# Patient Record
Sex: FEMALE | Race: BLACK OR AFRICAN AMERICAN | NOT HISPANIC OR LATINO | URBAN - METROPOLITAN AREA
[De-identification: names, ages, dates, MRNs, and addresses within clinical notes are randomized per-mention and may not be internally consistent; named-entity substitution may affect disease eponyms.]

---

## 2023-04-05 ENCOUNTER — LAB (OUTPATIENT)
Dept: LAB | Facility: LAB | Age: 33
End: 2023-04-05
Payer: COMMERCIAL

## 2023-04-05 ENCOUNTER — OFFICE VISIT (OUTPATIENT)
Dept: PRIMARY CARE | Facility: CLINIC | Age: 33
End: 2023-04-05
Payer: COMMERCIAL

## 2023-04-05 VITALS
OXYGEN SATURATION: 99 % | TEMPERATURE: 98.4 F | DIASTOLIC BLOOD PRESSURE: 81 MMHG | WEIGHT: 138 LBS | HEIGHT: 63 IN | SYSTOLIC BLOOD PRESSURE: 116 MMHG | BODY MASS INDEX: 24.45 KG/M2 | HEART RATE: 99 BPM

## 2023-04-05 DIAGNOSIS — R53.83 FATIGUE, UNSPECIFIED TYPE: ICD-10-CM

## 2023-04-05 DIAGNOSIS — Z30.42 DEPO-PROVERA CONTRACEPTIVE STATUS: Primary | ICD-10-CM

## 2023-04-05 PROBLEM — R87.610 ATYPICAL SQUAMOUS CELLS OF UNDETERMINED SIGNIFICANCE (ASCUS) ON PAPANICOLAOU SMEAR OF CERVIX: Status: ACTIVE | Noted: 2023-04-05

## 2023-04-05 PROBLEM — A56.09 CHLAMYDIA VAGINITIS/CERVICITIS: Status: ACTIVE | Noted: 2023-04-05

## 2023-04-05 PROBLEM — N76.0 BACTERIAL VAGINOSIS: Status: ACTIVE | Noted: 2023-04-05

## 2023-04-05 PROBLEM — A56.02 CHLAMYDIA VAGINITIS/CERVICITIS: Status: ACTIVE | Noted: 2023-04-05

## 2023-04-05 PROBLEM — N64.3 GALACTORRHEA: Status: ACTIVE | Noted: 2023-04-05

## 2023-04-05 PROBLEM — B37.31 CANDIDAL VULVOVAGINITIS: Status: ACTIVE | Noted: 2023-04-05

## 2023-04-05 PROBLEM — K59.00 CONSTIPATION: Status: ACTIVE | Noted: 2023-04-05

## 2023-04-05 PROBLEM — S63.259A FINGER DISLOCATION: Status: ACTIVE | Noted: 2023-04-05

## 2023-04-05 PROBLEM — B96.89 BACTERIAL VAGINOSIS: Status: ACTIVE | Noted: 2023-04-05

## 2023-04-05 LAB
ERYTHROCYTE DISTRIBUTION WIDTH (RATIO) BY AUTOMATED COUNT: 12.3 % (ref 11.5–14.5)
ERYTHROCYTE MEAN CORPUSCULAR HEMOGLOBIN CONCENTRATION (G/DL) BY AUTOMATED: 33 G/DL (ref 32–36)
ERYTHROCYTE MEAN CORPUSCULAR VOLUME (FL) BY AUTOMATED COUNT: 96 FL (ref 80–100)
ERYTHROCYTES (10*6/UL) IN BLOOD BY AUTOMATED COUNT: 4.28 X10E12/L (ref 4–5.2)
HEMATOCRIT (%) IN BLOOD BY AUTOMATED COUNT: 41.2 % (ref 36–46)
HEMOGLOBIN (G/DL) IN BLOOD: 13.6 G/DL (ref 12–16)
LEUKOCYTES (10*3/UL) IN BLOOD BY AUTOMATED COUNT: 6.6 X10E9/L (ref 4.4–11.3)
NRBC (PER 100 WBCS) BY AUTOMATED COUNT: 0 /100 WBC (ref 0–0)
PLATELETS (10*3/UL) IN BLOOD AUTOMATED COUNT: 295 X10E9/L (ref 150–450)
PREGNANCY TEST URINE, POC: NEGATIVE

## 2023-04-05 PROCEDURE — 99213 OFFICE O/P EST LOW 20 MIN: CPT

## 2023-04-05 PROCEDURE — 81025 URINE PREGNANCY TEST: CPT

## 2023-04-05 PROCEDURE — 85027 COMPLETE CBC AUTOMATED: CPT

## 2023-04-05 PROCEDURE — 1036F TOBACCO NON-USER: CPT

## 2023-04-05 PROCEDURE — 36415 COLL VENOUS BLD VENIPUNCTURE: CPT

## 2023-04-05 PROCEDURE — 96372 THER/PROPH/DIAG INJ SC/IM: CPT

## 2023-04-05 RX ORDER — ACETAMINOPHEN 500 MG
5 TABLET ORAL NIGHTLY
COMMUNITY
Start: 2022-05-03

## 2023-04-05 RX ORDER — MEDROXYPROGESTERONE ACETATE 150 MG/ML
150 INJECTION, SUSPENSION INTRAMUSCULAR ONCE
Status: COMPLETED | OUTPATIENT
Start: 2023-04-05 | End: 2023-04-05

## 2023-04-05 RX ADMIN — MEDROXYPROGESTERONE ACETATE 150 MG: 150 INJECTION, SUSPENSION INTRAMUSCULAR at 14:20

## 2023-04-05 ASSESSMENT — PAIN SCALES - GENERAL: PAINLEVEL: 6
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
## 2023-04-05 NOTE — LETTER
April 26, 2023     Patient: Jarrett Manriquez   YOB: 1990   Date of Visit: 4/5/2023       To Whom It May Concern:    It is my medical opinion that Jarrett Manriquez may return to light duty immediately with the following restrictions:      Avoid physically demanding activities that require lifting >20 lbs   Avoid reaching above shoulder level    While Ms. Manriquez has made significant progress in her recovery, she still requires some accommodation at work. Due to her condition, she is unable to perform heavy work that requires significant physical exertion. However, she is capable of performing light work that does not require heavy lifting or strenuous physical activity.    Furthermore, she is able to type and use a computer without any restrictions.     Please let me know if you require any additional information or have any questions. I am confident that Ms. Manriquez will be able to make a positive contribution to your team with these accommodations in place.    Sincerely,    Mickey Lowery MD, MPH                  If you have any questions or concerns, please don't hesitate to call.         Sincerely,        Mickey Lowery MD    CC: No Recipients

## 2023-04-05 NOTE — LETTER
April 7, 2023     Patient: Jarrett Manriquez   YOB: 1990   Date of Visit: 4/5/2023       To Whom It May Concern:    It is my medical opinion that Jarrett Manriquez has the following work restrictions.     Employee's Capabilities  Lifting/Carrying: less than 40 lbs.  Simple grasping: patient with limited ability to grasp with left hand.   Fine manipulation (includes keyboarding): not at all  Reaching above shoulder: patient with limited ability to reach above shoulder     If you have any questions or concerns, please don't hesitate to call.         Sincerely,        Mickey Lowery MD, MPH    CC: No Recipients
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
## 2023-04-10 NOTE — PROGRESS NOTES
I reviewed with the resident the medical history and the resident’s findings on physical examination.  I discussed with the resident the patient’s diagnosis and concur with the treatment plan as documented in the resident note.     David Ramirez MD

## 2023-06-09 ENCOUNTER — LAB (OUTPATIENT)
Dept: LAB | Facility: LAB | Age: 33
End: 2023-06-09
Payer: COMMERCIAL

## 2023-06-09 ENCOUNTER — OFFICE VISIT (OUTPATIENT)
Dept: PRIMARY CARE | Facility: CLINIC | Age: 33
End: 2023-06-09
Payer: COMMERCIAL

## 2023-06-09 ENCOUNTER — APPOINTMENT (OUTPATIENT)
Dept: PRIMARY CARE | Facility: CLINIC | Age: 33
End: 2023-06-09
Payer: COMMERCIAL

## 2023-06-09 VITALS
DIASTOLIC BLOOD PRESSURE: 80 MMHG | SYSTOLIC BLOOD PRESSURE: 120 MMHG | HEART RATE: 69 BPM | OXYGEN SATURATION: 98 % | WEIGHT: 134.2 LBS | TEMPERATURE: 97.7 F | BODY MASS INDEX: 24.15 KG/M2

## 2023-06-09 DIAGNOSIS — R30.0 DYSURIA: ICD-10-CM

## 2023-06-09 DIAGNOSIS — K21.9 GASTROESOPHAGEAL REFLUX DISEASE, UNSPECIFIED WHETHER ESOPHAGITIS PRESENT: ICD-10-CM

## 2023-06-09 DIAGNOSIS — R30.0 DYSURIA: Primary | ICD-10-CM

## 2023-06-09 LAB
HIV 1/ 2 AG/AB SCREEN: NONREACTIVE
SYPHILIS TOTAL AB: NONREACTIVE

## 2023-06-09 PROCEDURE — 36415 COLL VENOUS BLD VENIPUNCTURE: CPT

## 2023-06-09 PROCEDURE — 99213 OFFICE O/P EST LOW 20 MIN: CPT | Performed by: STUDENT IN AN ORGANIZED HEALTH CARE EDUCATION/TRAINING PROGRAM

## 2023-06-09 PROCEDURE — 87389 HIV-1 AG W/HIV-1&-2 AB AG IA: CPT

## 2023-06-09 PROCEDURE — 86780 TREPONEMA PALLIDUM: CPT

## 2023-06-09 PROCEDURE — 1036F TOBACCO NON-USER: CPT | Performed by: STUDENT IN AN ORGANIZED HEALTH CARE EDUCATION/TRAINING PROGRAM

## 2023-06-09 RX ORDER — FAMOTIDINE 10 MG/1
10 TABLET ORAL 2 TIMES DAILY
Qty: 60 TABLET | Refills: 5 | Status: SHIPPED | OUTPATIENT
Start: 2023-06-09 | End: 2023-12-06

## 2023-06-09 ASSESSMENT — PAIN SCALES - GENERAL: PAINLEVEL: 0-NO PAIN

## 2023-06-09 NOTE — PROGRESS NOTES
Subjective   Patient ID: Jarrett Manriquez is a 33 y.o. female who presents for STI Screening.    HPI     #Dysuria  -Recent seen in ED in end of May when symptoms began, wet prep done, was treated with Metronidazole gel, pt completed this course, is still having vaginal discharge that is light brown  -Pt is on Depo, normally does not have periods, last depo in 4/2023  -Denies fevers or chills, has chronic back pain which has not gotten worse  -Denies blisters in genital area     #Abdominal pain  -Feels this mainly in the mornings, gets frequent indigestion, pt does not eat a lot of spicy foods, but does well with tomatoes, eats late in the night   -Pain is in upper quadrants bl, lasts for a few days, goes away on its own, denies any nausea or vomiting, eating well, denies swallowing issues  -Drinks lots of water       Review of Systems    Denies any current fevers, chest pain, SOB, nausea, back pain    Objective   /80 (BP Location: Right arm, Patient Position: Sitting, BP Cuff Size: Adult)   Pulse 69   Temp 36.5 °C (97.7 °F) (Temporal)   Wt 60.9 kg (134 lb 3.2 oz)   SpO2 98%   BMI 24.15 kg/m²     Physical Exam    Constitutional: Well developed, awake, alert, oriented x3  Head and Face: NCAT  Eyes: Normal external exam, clear sclera bl  ENT: Normal external inspection of ears and nose. Oropharynx normal.  Cardiovascular: RRR, S1/S2, no murmurs, rubs, or gallops, radial pulses +2, no edema of extremities  Pulmonary: CTAB, no respiratory distress.  Abdomen: +BS, soft, non-tender, nondistended, no guarding or rebound, no masses noted  Neuro: A&O x3, CN II-XII grossly intact, no focal neuro deficits   MSK: No joint swelling, normal movements of all extremities. Range of motion- normal. CVA tenderness negative bl  Skin- No lesions, contusions, or erythema.  Psychiatric: Judgment intact. Appropriate mood and behavior     Assessment/Plan   Diagnoses and all orders for this visit:  Dysuria  -     C.  Trachomatis / N. Gonorrhoeae, Amplified Detection; Future  -     HIV 1/2 Antigen/Antibody Screen with Reflex to Confirmation; Future  -     Syphilis Screen with Reflex; Future  -     TRICH VAGINALIS, AMPLIFIED; Future  -     Urinalysis with Reflex Microscopic; Future  -     Urine Culture; Future  -No red flag symptoms or findings on exam suspicious of sepsis or pyelo, recently had wet prep and treated for BV, will screen for all STIs and UTIs and treat PRN  Gastroesophageal reflux disease, unspecified whether esophagitis present  -     famotidine (Pepcid) 10 mg tablet; Take 1 tablet (10 mg) by mouth 2 times a day.  -Symptoms appearing as GERD, worse in AM, consulted pt about proper GERD/ food eating etiquette and to avoid eating late in the night, begin pepcid    Discussed with:  Dr. Kapadia  Return in : 1-2 months for FUV    Portions of this note were generated using digital voice recognition software, and may contain grammatical errors       Yomi Jones MD  PGY-3, Family Medicine

## 2023-06-16 NOTE — PROGRESS NOTES
I reviewed with the resident the medical history and the resident’s findings on physical examination.  I discussed with the resident the patient’s diagnosis and concur with the treatment plan as documented in the resident note.     Follow-up in 1 month for GERD, and to discuss test results.    Merle Kapadia MD

## 2023-06-22 ENCOUNTER — CLINICAL SUPPORT (OUTPATIENT)
Dept: PRIMARY CARE | Facility: CLINIC | Age: 33
End: 2023-06-22
Payer: COMMERCIAL

## 2023-06-22 VITALS
HEART RATE: 85 BPM | TEMPERATURE: 97 F | HEIGHT: 63 IN | OXYGEN SATURATION: 99 % | SYSTOLIC BLOOD PRESSURE: 103 MMHG | DIASTOLIC BLOOD PRESSURE: 69 MMHG | BODY MASS INDEX: 24.15 KG/M2

## 2023-06-22 DIAGNOSIS — Z30.42 DEPO-PROVERA CONTRACEPTIVE STATUS: Primary | ICD-10-CM

## 2023-06-22 PROCEDURE — 96372 THER/PROPH/DIAG INJ SC/IM: CPT | Performed by: FAMILY MEDICINE

## 2023-06-22 RX ORDER — MEDROXYPROGESTERONE ACETATE 150 MG/ML
150 INJECTION, SUSPENSION INTRAMUSCULAR ONCE
Status: COMPLETED | OUTPATIENT
Start: 2023-06-22 | End: 2023-06-22

## 2023-06-22 RX ADMIN — MEDROXYPROGESTERONE ACETATE 150 MG: 150 INJECTION, SUSPENSION INTRAMUSCULAR at 13:42

## 2023-06-22 ASSESSMENT — PAIN SCALES - GENERAL: PAINLEVEL: 0-NO PAIN

## 2023-09-14 ENCOUNTER — CLINICAL SUPPORT (OUTPATIENT)
Dept: PRIMARY CARE | Facility: CLINIC | Age: 33
End: 2023-09-14
Payer: COMMERCIAL

## 2023-09-14 VITALS
BODY MASS INDEX: 23.11 KG/M2 | HEART RATE: 79 BPM | SYSTOLIC BLOOD PRESSURE: 117 MMHG | WEIGHT: 128.4 LBS | DIASTOLIC BLOOD PRESSURE: 75 MMHG

## 2023-09-14 DIAGNOSIS — Z30.42 DEPO-PROVERA CONTRACEPTIVE STATUS: Primary | ICD-10-CM

## 2023-09-14 PROCEDURE — 96372 THER/PROPH/DIAG INJ SC/IM: CPT | Performed by: STUDENT IN AN ORGANIZED HEALTH CARE EDUCATION/TRAINING PROGRAM

## 2023-09-14 RX ORDER — MEDROXYPROGESTERONE ACETATE 150 MG/ML
150 INJECTION, SUSPENSION INTRAMUSCULAR ONCE
Status: COMPLETED | OUTPATIENT
Start: 2023-09-14 | End: 2023-09-14

## 2023-09-14 RX ADMIN — MEDROXYPROGESTERONE ACETATE 150 MG: 150 INJECTION, SUSPENSION INTRAMUSCULAR at 14:34

## 2023-09-14 NOTE — PROGRESS NOTES
Pt here to receive 3rd dose depo provera, 2nd NV. B/P WNL. Tolerated without event. Calendar given for window next nurse visit administration.

## 2023-12-04 ENCOUNTER — CLINICAL SUPPORT (OUTPATIENT)
Dept: PRIMARY CARE | Facility: CLINIC | Age: 33
End: 2023-12-04
Payer: COMMERCIAL

## 2023-12-04 VITALS — HEART RATE: 91 BPM | DIASTOLIC BLOOD PRESSURE: 84 MMHG | SYSTOLIC BLOOD PRESSURE: 122 MMHG

## 2023-12-04 DIAGNOSIS — Z30.42 DEPO-PROVERA CONTRACEPTIVE STATUS: Primary | ICD-10-CM

## 2023-12-04 PROCEDURE — 96372 THER/PROPH/DIAG INJ SC/IM: CPT | Performed by: STUDENT IN AN ORGANIZED HEALTH CARE EDUCATION/TRAINING PROGRAM

## 2023-12-04 RX ORDER — MEDROXYPROGESTERONE ACETATE 150 MG/ML
150 INJECTION, SUSPENSION INTRAMUSCULAR ONCE
Status: COMPLETED | OUTPATIENT
Start: 2023-12-04 | End: 2023-12-04

## 2023-12-04 RX ADMIN — MEDROXYPROGESTERONE ACETATE 150 MG: 150 INJECTION, SUSPENSION INTRAMUSCULAR at 11:36

## 2023-12-04 NOTE — PROGRESS NOTES
Pt in clinic today to receive Depo contraception. This is pt 3rd NV. VS WNL. Administered to L arm without event. Calendar given for next administration window. Annual provider visit scheduled via this nurse.

## 2024-02-19 ENCOUNTER — OFFICE VISIT (OUTPATIENT)
Dept: PRIMARY CARE | Facility: CLINIC | Age: 34
End: 2024-02-19
Payer: COMMERCIAL

## 2024-02-19 VITALS
DIASTOLIC BLOOD PRESSURE: 68 MMHG | OXYGEN SATURATION: 100 % | TEMPERATURE: 98 F | HEIGHT: 62 IN | BODY MASS INDEX: 23.74 KG/M2 | SYSTOLIC BLOOD PRESSURE: 104 MMHG | WEIGHT: 129 LBS | HEART RATE: 106 BPM

## 2024-02-19 DIAGNOSIS — Z30.42 DEPO-PROVERA CONTRACEPTIVE STATUS: Primary | ICD-10-CM

## 2024-02-19 DIAGNOSIS — M54.50 LOW BACK PAIN WITHOUT SCIATICA, UNSPECIFIED BACK PAIN LATERALITY, UNSPECIFIED CHRONICITY: ICD-10-CM

## 2024-02-19 PROCEDURE — 96372 THER/PROPH/DIAG INJ SC/IM: CPT

## 2024-02-19 PROCEDURE — 99214 OFFICE O/P EST MOD 30 MIN: CPT

## 2024-02-19 PROCEDURE — 1036F TOBACCO NON-USER: CPT

## 2024-02-19 RX ORDER — MEDROXYPROGESTERONE ACETATE 150 MG/ML
150 INJECTION, SUSPENSION INTRAMUSCULAR ONCE
Status: COMPLETED | OUTPATIENT
Start: 2024-02-19 | End: 2024-02-19

## 2024-02-19 RX ADMIN — MEDROXYPROGESTERONE ACETATE 150 MG: 150 INJECTION, SUSPENSION INTRAMUSCULAR at 14:42

## 2024-02-19 ASSESSMENT — ENCOUNTER SYMPTOMS
NUMBNESS: 1
FACIAL ASYMMETRY: 0
CHILLS: 0
EYE DISCHARGE: 0
SORE THROAT: 0
PALPITATIONS: 0
DIZZINESS: 0
WOUND: 1
LIGHT-HEADEDNESS: 0
BACK PAIN: 1
DYSURIA: 0
NECK STIFFNESS: 0
SHORTNESS OF BREATH: 0
EYE REDNESS: 0
SEIZURES: 0
ABDOMINAL DISTENTION: 0
FATIGUE: 0
COUGH: 0
EYE PAIN: 0
UNEXPECTED WEIGHT CHANGE: 0
ARTHRALGIAS: 0
EYE ITCHING: 0
NAUSEA: 0
CONSTIPATION: 0
WEAKNESS: 1
VOMITING: 0
FEVER: 0
ABDOMINAL PAIN: 0
DIFFICULTY URINATING: 0
DIARRHEA: 0
CHOKING: 0
CHEST TIGHTNESS: 0

## 2024-02-19 ASSESSMENT — PAIN SCALES - GENERAL: PAINLEVEL: 0-NO PAIN

## 2024-02-19 ASSESSMENT — PATIENT HEALTH QUESTIONNAIRE - PHQ9
SUM OF ALL RESPONSES TO PHQ QUESTIONS 1-9: 12
8. MOVING OR SPEAKING SO SLOWLY THAT OTHER PEOPLE COULD HAVE NOTICED. OR THE OPPOSITE, BEING SO FIGETY OR RESTLESS THAT YOU HAVE BEEN MOVING AROUND A LOT MORE THAN USUAL: MORE THAN HALF THE DAYS
10. IF YOU CHECKED OFF ANY PROBLEMS, HOW DIFFICULT HAVE THESE PROBLEMS MADE IT FOR YOU TO DO YOUR WORK, TAKE CARE OF THINGS AT HOME, OR GET ALONG WITH OTHER PEOPLE: NOT DIFFICULT AT ALL
6. FEELING BAD ABOUT YOURSELF - OR THAT YOU ARE A FAILURE OR HAVE LET YOURSELF OR YOUR FAMILY DOWN: NOT AT ALL
9. THOUGHTS THAT YOU WOULD BE BETTER OFF DEAD, OR OF HURTING YOURSELF: NOT AT ALL
7. TROUBLE CONCENTRATING ON THINGS, SUCH AS READING THE NEWSPAPER OR WATCHING TELEVISION: MORE THAN HALF THE DAYS
3. TROUBLE FALLING OR STAYING ASLEEP OR SLEEPING TOO MUCH: MORE THAN HALF THE DAYS
4. FEELING TIRED OR HAVING LITTLE ENERGY: MORE THAN HALF THE DAYS
5. POOR APPETITE OR OVEREATING: NOT AT ALL
2. FEELING DOWN, DEPRESSED OR HOPELESS: MORE THAN HALF THE DAYS
1. LITTLE INTEREST OR PLEASURE IN DOING THINGS: MORE THAN HALF THE DAYS
SUM OF ALL RESPONSES TO PHQ9 QUESTIONS 1 AND 2: 4

## 2024-02-19 ASSESSMENT — ANXIETY QUESTIONNAIRES
1. FEELING NERVOUS, ANXIOUS, OR ON EDGE: NEARLY EVERY DAY
7. FEELING AFRAID AS IF SOMETHING AWFUL MIGHT HAPPEN: NEARLY EVERY DAY
5. BEING SO RESTLESS THAT IT IS HARD TO SIT STILL: NOT AT ALL
GAD7 TOTAL SCORE: 16
4. TROUBLE RELAXING: MORE THAN HALF THE DAYS
6. BECOMING EASILY ANNOYED OR IRRITABLE: MORE THAN HALF THE DAYS
IF YOU CHECKED OFF ANY PROBLEMS ON THIS QUESTIONNAIRE, HOW DIFFICULT HAVE THESE PROBLEMS MADE IT FOR YOU TO DO YOUR WORK, TAKE CARE OF THINGS AT HOME, OR GET ALONG WITH OTHER PEOPLE: SOMEWHAT DIFFICULT
3. WORRYING TOO MUCH ABOUT DIFFERENT THINGS: NEARLY EVERY DAY
2. NOT BEING ABLE TO STOP OR CONTROL WORRYING: NEARLY EVERY DAY

## 2024-02-19 NOTE — PROGRESS NOTES
"Subjective   Jarrett Manriquez is a 33 y.o. female with no significant PMH presenting today for followup.    Depo shot:  She has not had her period for about 14 years with no break through bleeding or known side effects such as mood swings, unwanted weight changes.  She is happy with her current contraceptive regiment.     Low back pain:  Pt endorses low back pain for the past year and has been getting worse. It is in her lower lumbar region, more on the left side. She says it feels like everything is tight and is worse when she stands for prolonged period of time. It does not radiate and there are no motor or sensation loss and no bladder or bowel incontinence.     Bug bite on hand:  Pt was bitten on her L thumb/hand roughly 2-3 weeks ago. It was very itchy at worse, and then she scratched it a lot and it eventually became red, swollen, and pus filled. She went to the ED on 2/1 and they gave her cephalexin. She did not take this because in the past she has gotten yeast infections form taking oral antibiotics and did not want to take an antibiotic without something to prevent yeast infections. Since then, the pus is gone and have since improved. There is still peeled skin, but it is not red or swollen. It is darker in appearance than prior to the bug bite.     Finger/toes numbness  Decreased hand strength  She says that randomly she has numbess of all of the tips of her fingers and her toes at random times. It is not related to the cold and there are not color changes. 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 It goes back to normal after like 30 seconds.\"     Review of Systems  Review of Systems   Constitutional:  Negative for chills, fatigue, fever and unexpected weight change.   HENT:  Negative for congestion, hearing loss and sore throat.    Eyes:  Negative for pain, discharge, redness and itching.   Respiratory:  Negative for cough, choking, " chest tightness and shortness of breath.    Cardiovascular:  Negative for chest pain, palpitations and leg swelling.   Gastrointestinal:  Negative for abdominal distention, abdominal pain, constipation, diarrhea, nausea and vomiting.   Endocrine: Negative for cold intolerance, heat intolerance and polyuria.   Genitourinary:  Negative for difficulty urinating and dysuria.   Musculoskeletal:  Positive for back pain. Negative for arthralgias and neck stiffness.   Skin:  Positive for wound. Negative for rash.   Neurological:  Positive for weakness and numbness. Negative for dizziness, seizures, facial asymmetry and light-headedness.       Objective   Physical Exam  Constitutional:       General: She is not in acute distress.     Appearance: Normal appearance. She is normal weight. She is not ill-appearing or toxic-appearing.   HENT:      Head:      Comments: Chovstek sign negative      Mouth/Throat:      Mouth: Mucous membranes are moist.      Pharynx: Oropharynx is clear. No oropharyngeal exudate or posterior oropharyngeal erythema.   Eyes:      Extraocular Movements: Extraocular movements intact.      Pupils: Pupils are equal, round, and reactive to light.   Cardiovascular:      Rate and Rhythm: Normal rate and regular rhythm.      Pulses: Normal pulses.      Heart sounds: Normal heart sounds. No murmur heard.     No friction rub.   Pulmonary:      Effort: Pulmonary effort is normal. No respiratory distress.      Breath sounds: Normal breath sounds. No stridor. No wheezing.   Abdominal:      General: Abdomen is flat. Bowel sounds are normal. There is no distension.      Palpations: Abdomen is soft. There is no mass.      Tenderness: There is no abdominal tenderness.      Hernia: No hernia is present.   Musculoskeletal:         General: Tenderness present.      Comments: No spinal tenderness to palpation. Left paraspinal tenderness and tightness to palpation.    Back:  ROM WNL to flexion, extension, rotation. Straight  leg tests negative bilaterally.     Hands:  Negative phalen and tinel tests. Sensation and motor function intact bilaterally proximally and distally. Pulses palpable bilaterally. Strength 5/5 on wrist flexion and extension. Finger abduction and adduction 5/5. No atrophy and muscles.            Neurological:      Mental Status: She is alert.         Assessment/Plan   ANTONIO Manriquez is a 34 yo pt who presents to the clinic for depo shot and to obtain yeast prophylaxis. Pt suustained a bug bite on the left hand which became infected, becoming edematous and painful. She went to the ED and was prescribed keflex for soft tissue infection. However she did not take the abx as she was concerned about developing a yeast infection and she comes to the clinic today to obtain ppx. However on  physical exam it appears that the pt has cleared the infection without abx. Pt has no pain, edema, or erythema to the hand. She does have thickeing of the skin and changes to the color of the skin at site of the wound. The pt also complains of paresthesia to the tips of all fingers and toes not associated w/ temperature. No dermatomal pattern followed. Will continue to monitor.     #wound on left hand   :: appears well healing.   - discussed with pt regarding cost / benefit of abx. Pt agrees to hold off from taking abx.   - pt given return prevautions if hand becomes painful or edematous.     #paresthesias of fingers and toes   :: no dermatomal pattern followed and no association with temperature   - continue to monitor     #back pain   :: paraspinal muscle tightness   - start flexeril 10 mg at bedtime PRN   - pt counseled on adverse effects of flexeril     RTC in 3 months for HM    Seen and discussed with Dr. Ousmane Wynne, MS3    Mickey Lowery MD, MPH   Family Medicine and Preventive Health, PGY-2    I saw and evaluated the patient with the medical student. I personally obtained the key and critical portions of the history and  physical exam. I reviewed and modified the student's documentation and discussed patient with the medical student. I agree with the above documentation and medical decision making.

## 2024-02-21 RX ORDER — CYCLOBENZAPRINE HCL 10 MG
10 TABLET ORAL NIGHTLY PRN
Qty: 30 TABLET | Refills: 0 | Status: SHIPPED | OUTPATIENT
Start: 2024-02-21 | End: 2024-02-26 | Stop reason: SDUPTHER

## 2024-02-22 NOTE — PROGRESS NOTES
I saw and evaluated the patient. I personally obtained the key and critical portions of the history and physical exam or was physically present for key and critical portions performed by the resident/fellow. I reviewed the resident/fellow's documentation and discussed the patient with the resident/fellow. I agree with the resident/fellow's medical decision making as documented in the note.    Rizwan Romeo MD

## 2024-02-26 DIAGNOSIS — M54.50 LOW BACK PAIN WITHOUT SCIATICA, UNSPECIFIED BACK PAIN LATERALITY, UNSPECIFIED CHRONICITY: Primary | ICD-10-CM

## 2024-02-26 DIAGNOSIS — M54.50 LOW BACK PAIN WITHOUT SCIATICA, UNSPECIFIED BACK PAIN LATERALITY, UNSPECIFIED CHRONICITY: ICD-10-CM

## 2024-02-26 RX ORDER — CYCLOBENZAPRINE HCL 10 MG
10 TABLET ORAL NIGHTLY PRN
Qty: 30 TABLET | Refills: 0 | Status: SHIPPED | OUTPATIENT
Start: 2024-02-26 | End: 2024-04-26

## 2024-02-26 NOTE — TELEPHONE ENCOUNTER
Communication received pt requesting fill of cyclobenzaprine prescribed at last appt., Feb 19th 2024. Noted med sent to pharmacy Feb 21st 2024, to [facility]. Call placed to pt for pharmacy preference clarification. Pt stated she never utilized [facility] and only uses [facility] in [place], [place]. [facility] removed from pt chart, per request. Pt also inquiring about PT referral r/t back and finger pain. Message sent to provider r/t referral, and order pended and routed for resend.

## 2024-05-06 NOTE — PROGRESS NOTES
Jarrett Manriquez  [id number]    Patient was discussed with resident Mickey Lowery MD and with attending Dr. Romeo. Agreeable with plan as discussed.     Gary Bursn MD  Family Medicine  PGY3

## 2024-05-20 ENCOUNTER — CLINICAL SUPPORT (OUTPATIENT)
Dept: PRIMARY CARE | Facility: CLINIC | Age: 34
End: 2024-05-20
Payer: COMMERCIAL

## 2024-05-20 VITALS — DIASTOLIC BLOOD PRESSURE: 82 MMHG | SYSTOLIC BLOOD PRESSURE: 117 MMHG

## 2024-05-20 DIAGNOSIS — Z30.42 ENCOUNTER FOR DEPO-PROVERA CONTRACEPTION: ICD-10-CM

## 2024-05-20 PROCEDURE — 96372 THER/PROPH/DIAG INJ SC/IM: CPT | Performed by: EMERGENCY MEDICINE

## 2024-05-20 RX ORDER — MEDROXYPROGESTERONE ACETATE 150 MG/ML
150 INJECTION, SUSPENSION INTRAMUSCULAR ONCE
Status: COMPLETED | OUTPATIENT
Start: 2024-05-20 | End: 2024-05-20

## 2024-05-20 RX ADMIN — MEDROXYPROGESTERONE ACETATE 150 MG: 150 INJECTION, SUSPENSION INTRAMUSCULAR at 12:09

## 2024-05-20 NOTE — PROGRESS NOTES
Pt in clinic to receive Depo shot. This is pt 1st NV. BP WNL. Administered to R arm without event. Calenedar given for next administration window. Appt scheduled for next dose.  
Emergent/ED

## 2024-05-28 ENCOUNTER — EVALUATION (OUTPATIENT)
Dept: PHYSICAL THERAPY | Facility: HOSPITAL | Age: 34
End: 2024-05-28
Payer: COMMERCIAL

## 2024-05-28 DIAGNOSIS — M54.50 LOW BACK PAIN WITHOUT SCIATICA, UNSPECIFIED BACK PAIN LATERALITY, UNSPECIFIED CHRONICITY: ICD-10-CM

## 2024-05-28 DIAGNOSIS — G89.29 CHRONIC MIDLINE LOW BACK PAIN WITHOUT SCIATICA: Primary | ICD-10-CM

## 2024-05-28 DIAGNOSIS — M54.50 CHRONIC MIDLINE LOW BACK PAIN WITHOUT SCIATICA: Primary | ICD-10-CM

## 2024-05-28 PROCEDURE — 97110 THERAPEUTIC EXERCISES: CPT | Mod: GP | Performed by: PHYSICAL THERAPIST

## 2024-05-28 PROCEDURE — 97161 PT EVAL LOW COMPLEX 20 MIN: CPT | Mod: GP | Performed by: PHYSICAL THERAPIST

## 2024-05-28 ASSESSMENT — ENCOUNTER SYMPTOMS
DEPRESSION: 1
OCCASIONAL FEELINGS OF UNSTEADINESS: 1
LOSS OF SENSATION IN FEET: 0
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
## 2024-06-17 ENCOUNTER — TREATMENT (OUTPATIENT)
Dept: PHYSICAL THERAPY | Facility: HOSPITAL | Age: 34
End: 2024-06-17
Payer: COMMERCIAL

## 2024-06-17 DIAGNOSIS — M54.50 LOW BACK PAIN WITHOUT SCIATICA, UNSPECIFIED BACK PAIN LATERALITY, UNSPECIFIED CHRONICITY: ICD-10-CM

## 2024-06-17 DIAGNOSIS — M54.50 CHRONIC MIDLINE LOW BACK PAIN WITHOUT SCIATICA: Primary | ICD-10-CM

## 2024-06-17 DIAGNOSIS — G89.29 CHRONIC MIDLINE LOW BACK PAIN WITHOUT SCIATICA: Primary | ICD-10-CM

## 2024-06-17 PROCEDURE — 97110 THERAPEUTIC EXERCISES: CPT | Mod: GP | Performed by: PHYSICAL THERAPIST

## 2024-06-17 NOTE — PROGRESS NOTES
"Treatment note  Physical Therapy Treatment  Patient Name:Jarrett Manriquez  MRN:[id number]  Today's Date:2024  Referred by: Rizwan Romeo  Time Calculation  Start Time: 1147  Stop Time: 1228  Time Calculation (min): 41 min    Therapy Diagnosis  1. Chronic midline low back pain without sciatica    2. Low back pain without sciatica, unspecified back pain laterality, unspecified chronicity         Assessment: Patient requires verbal and visual cuing for instruction of exercises. Today's session focusing on improving mobility along with core and hip stabilization. No increased radicular complaints with exercises today. Decreased stiffness in lumbar spine and initiated lumbo-pelvic stabilization exercises today.    Plan: Plan to progress with core stabilization as tolerated    Insurance  Visit number: 2  Approved number of visits: 20  Onset Date: 2024  Certification Period:  Beginnin2024    Endin2024    Precautions/Fall Risk:none* Pacemaker no  Seizures No  Post Op Movement/Restrictions No    Subjective/Pain: Patient reports she is doing pretty well currently and has pain in her low back randomly going into hips/glutes but that comes and goes.  Current Pain Level (0-10): 4    HEP Compliance: Good    Objective/Outcome Measures:    Treatment  Therapeutic Exercise 41 minutes  - Seated Stepper: Level 3, 6 minutes  - Seated Hamstring Stretch  1 rep - R/L 1 min Hold  - Seated Piriformis Stretch with Trunk Bend  - 1 rep - R/L 1 min hold  - Supine Iso Table Top holds 5\" x 20   - Supine SKTC + Trunk Rotation  R/L  5  reps - 10 hold  - Hip Bridge DL: x 15 // SL x 10 ea   - SL Clamshells (Feet lifted): 2 x 10 R/L  - S/L open book rotations: 10\" x 10 R/L    Goals:  Lumbar Spine Goals:  By discharge, patient will:  1) Demonstrate independence with home exercise program  2) Tolerate PLOA without adverse reaction  3) Increase strength of LE  to 5/5 in order to improve the ability to perform essential ADLs  4) " Improve positional tolerances of standing, sitting, walking > 1 hour for essential ADLs and work duties.   5) Report decrease in pain by >= 2 points to meet MCID  6) Decrease score of Modified CARLOS by > 6 points to meet the MCID  7) Be able to sleep through the night without an increase in symptoms    Patient stated goal: improve stiffness in back

## 2024-07-09 ENCOUNTER — APPOINTMENT (OUTPATIENT)
Dept: PHYSICAL THERAPY | Facility: HOSPITAL | Age: 34
End: 2024-07-09
Payer: COMMERCIAL

## 2024-07-29 ENCOUNTER — DOCUMENTATION (OUTPATIENT)
Dept: PHYSICAL THERAPY | Facility: HOSPITAL | Age: 34
End: 2024-07-29
Payer: COMMERCIAL

## 2024-07-29 NOTE — PROGRESS NOTES
Physical Therapy    Discharge Summary    Name: Jarrett Manriquez  MRN: [id number]  : 1990  Date: 24    Discharge Summary: PT    Discharge Information: Date of discharge 24, Date of last visit 24, Date of evaluation 24, Number of attended visits 2, Referred by Dr. Romeo, and Referred for Low back pain with sciatica    Discharge Status: Discharged     Rehab Discharge Reason: Attendance inconsistent and Failed to schedule and/or keep follow-up appointment(s)

## 2024-08-05 ENCOUNTER — APPOINTMENT (OUTPATIENT)
Dept: PRIMARY CARE | Facility: CLINIC | Age: 34
End: 2024-08-05
Payer: COMMERCIAL

## 2024-08-05 ENCOUNTER — CLINICAL SUPPORT (OUTPATIENT)
Dept: PRIMARY CARE | Facility: CLINIC | Age: 34
End: 2024-08-05
Payer: COMMERCIAL

## 2024-08-05 VITALS — SYSTOLIC BLOOD PRESSURE: 109 MMHG | DIASTOLIC BLOOD PRESSURE: 76 MMHG

## 2024-08-05 DIAGNOSIS — Z30.42 ENCOUNTER FOR DEPO-PROVERA CONTRACEPTION: Primary | ICD-10-CM

## 2024-08-05 PROCEDURE — 96372 THER/PROPH/DIAG INJ SC/IM: CPT | Performed by: FAMILY MEDICINE

## 2024-08-05 RX ORDER — MEDROXYPROGESTERONE ACETATE 150 MG/ML
150 INJECTION, SUSPENSION INTRAMUSCULAR ONCE
Status: COMPLETED | OUTPATIENT
Start: 2024-08-05 | End: 2024-08-05

## 2024-08-05 NOTE — PROGRESS NOTES
Pt in clinic to receive Depo. This is pt 2nd nurse visit. BP WNL. Administered to R deltoid without event. Calendar given for next administration window. Next administration scheduled prior to exiting the exam room.

## 2024-10-21 ENCOUNTER — APPOINTMENT (OUTPATIENT)
Dept: PRIMARY CARE | Facility: CLINIC | Age: 34
End: 2024-10-21
Payer: COMMERCIAL

## 2024-10-21 VITALS — SYSTOLIC BLOOD PRESSURE: 117 MMHG | DIASTOLIC BLOOD PRESSURE: 80 MMHG

## 2024-10-21 DIAGNOSIS — Z30.8 ENCOUNTER FOR OTHER CONTRACEPTIVE MANAGEMENT: Primary | ICD-10-CM

## 2024-10-21 PROCEDURE — 96372 THER/PROPH/DIAG INJ SC/IM: CPT | Performed by: FAMILY MEDICINE

## 2024-10-21 RX ORDER — MEDROXYPROGESTERONE ACETATE 150 MG/ML
150 INJECTION, SUSPENSION INTRAMUSCULAR ONCE
Status: COMPLETED | OUTPATIENT
Start: 2024-10-21 | End: 2024-10-21

## 2024-10-21 NOTE — PROGRESS NOTES
Pt in clinic for Depo administration. This is pt 3rd nurse visit, next appt will be with provider. Pt seen via MA. BP obtained via /80 taken in L arm. Depo administered without event. Calendar given for next administration window.

## 2024-11-05 ENCOUNTER — OFFICE VISIT (OUTPATIENT)
Dept: OBSTETRICS AND GYNECOLOGY | Facility: HOSPITAL | Age: 34
End: 2024-11-05
Payer: COMMERCIAL

## 2024-11-05 VITALS
DIASTOLIC BLOOD PRESSURE: 85 MMHG | HEIGHT: 63 IN | SYSTOLIC BLOOD PRESSURE: 123 MMHG | WEIGHT: 128 LBS | BODY MASS INDEX: 22.68 KG/M2

## 2024-11-05 DIAGNOSIS — Z01.419 ENCOUNTER FOR GYNECOLOGICAL EXAMINATION WITHOUT ABNORMAL FINDING: Primary | ICD-10-CM

## 2024-11-05 DIAGNOSIS — E04.9 GOITER: ICD-10-CM

## 2024-11-05 DIAGNOSIS — N76.0 RECURRENT VAGINITIS: ICD-10-CM

## 2024-11-05 PROCEDURE — 1036F TOBACCO NON-USER: CPT | Performed by: ADVANCED PRACTICE MIDWIFE

## 2024-11-05 PROCEDURE — 3008F BODY MASS INDEX DOCD: CPT | Performed by: ADVANCED PRACTICE MIDWIFE

## 2024-11-05 PROCEDURE — 99385 PREV VISIT NEW AGE 18-39: CPT | Performed by: ADVANCED PRACTICE MIDWIFE

## 2024-11-05 RX ORDER — MEDROXYPROGESTERONE ACETATE 150 MG/ML
150 INJECTION, SUSPENSION INTRAMUSCULAR
COMMUNITY

## 2024-11-05 SDOH — ECONOMIC STABILITY: FOOD INSECURITY: WITHIN THE PAST 12 MONTHS, THE FOOD YOU BOUGHT JUST DIDN'T LAST AND YOU DIDN'T HAVE MONEY TO GET MORE.: NEVER TRUE

## 2024-11-05 SDOH — ECONOMIC STABILITY: FOOD INSECURITY: WITHIN THE PAST 12 MONTHS, YOU WORRIED THAT YOUR FOOD WOULD RUN OUT BEFORE YOU GOT MONEY TO BUY MORE.: NEVER TRUE

## 2024-11-05 ASSESSMENT — ENCOUNTER SYMPTOMS
LOSS OF SENSATION IN FEET: 0
OCCASIONAL FEELINGS OF UNSTEADINESS: 0
DEPRESSION: 0

## 2024-11-05 ASSESSMENT — PAIN SCALES - GENERAL: PAINLEVEL_OUTOF10: 0-NO PAIN

## 2024-11-05 ASSESSMENT — PATIENT HEALTH QUESTIONNAIRE - PHQ9
1. LITTLE INTEREST OR PLEASURE IN DOING THINGS: NOT AT ALL
2. FEELING DOWN, DEPRESSED OR HOPELESS: NOT AT ALL
SUM OF ALL RESPONSES TO PHQ9 QUESTIONS 1 & 2: 0

## 2024-11-05 ASSESSMENT — SOCIAL DETERMINANTS OF HEALTH (SDOH)
WITHIN THE LAST YEAR, HAVE TO BEEN RAPED OR FORCED TO HAVE ANY KIND OF SEXUAL ACTIVITY BY YOUR PARTNER OR EX-PARTNER?: NO
WITHIN THE LAST YEAR, HAVE YOU BEEN KICKED, HIT, SLAPPED, OR OTHERWISE PHYSICALLY HURT BY YOUR PARTNER OR EX-PARTNER?: NO
WITHIN THE LAST YEAR, HAVE YOU BEEN AFRAID OF YOUR PARTNER OR EX-PARTNER?: NO
WITHIN THE LAST YEAR, HAVE YOU BEEN HUMILIATED OR EMOTIONALLY ABUSED IN OTHER WAYS BY YOUR PARTNER OR EX-PARTNER?: NO

## 2024-11-05 NOTE — PROGRESS NOTES
"Subjective   Jarrett Manriquez is a 34 y.o. female who is here for Annual Exam (Patient is on Birth control-DEPO/Declined STI TESTING/Last pap 5/3/2022 ASCUS HPV pos/Declined STI TESTING/Declined chaperone [facility] MA).     Concerns today:  recurrent BV and yeast, 3 positive cultures in the last year    Periods are  amenorrheic on depo    Sexual Activity: sexually active, male partner  Pain with intercourse? No   Loss of desire? No   Able to have an orgasm? Yes     History of prior STI:  HPV  Desires STI screening? No    Current contraception: Depo-Provera injections    Last pap: 2022  History of abnormal Pap smear: yes - ASCUS HPV+   Family history of uterine or ovarian cancer: no  Family history of breast cancer: yes - great grandmother  OB History    Para Term  AB Living   1 1 0 1 0 1   SAB IAB Ectopic Multiple Live Births   0 0 0 0 0      Objective   /85   Ht 1.588 m (5' 2.5\")   Wt 58.1 kg (128 lb)    Physical Exam  Constitutional:       General: She is not in acute distress.     Appearance: Normal appearance. She is well-developed.   Genitourinary:      Urethral meatus normal.      No lesions in the vagina.      Right Labia: No rash, lesions or skin changes.     Left Labia: No lesions, skin changes or rash.     Vaginal discharge present.      No vaginal erythema or bleeding.      No vaginal prolapse present.     No vaginal atrophy present.       Right Adnexa: not tender and no mass present.     Left Adnexa: not tender and no mass present.     Cervix is parous.      No cervical motion tenderness, discharge, friability or lesion.      Uterus is not fixed, tender or irregular.      No uterine mass detected.     Uterus is anteverted.      Pelvic exam was performed with patient in the lithotomy position.   Breasts:     Right: Normal.      Left: Normal.   Neck:      Thyroid: Thyromegaly present.   Cardiovascular:      Heart sounds: Normal heart sounds.   Pulmonary:      Effort: Pulmonary " effort is normal.      Breath sounds: Normal breath sounds.   Abdominal:      Palpations: Abdomen is soft. There is no mass.      Tenderness: There is no abdominal tenderness.   Neurological:      General: No focal deficit present.   Skin:     General: Skin is warm and dry.   Psychiatric:         Mood and Affect: Mood and affect normal.         Behavior: Behavior is cooperative.   Vitals reviewed.     Assessment/Plan   Diagnoses and all orders for this visit:  Encounter for gynecological examination without abnormal finding  -     routine AE  -     healthy lifestyle encouraged  -     pap & HPV collected  -     condoms as needed for STI prevention  -     had Gardasil x1 in 2007, will check immunization record to be sure she received full series  -     Recommend PCP visit for routine health maintenance  -     RTO 1 year for AE or sooner PRN  Recurrent vaginitis        -     reviewed personal hygiene products and practices        -     asymptomatic today, though yeast discharge on exam -- took fluconazole yesterday        -     start OTC vaginal boric acid suppositories        -     use 1 suppository each night x2 weeks, then 1-2 times per week therafter  Goiter  -     TSH with reflex to Free T4 if abnormal; Future  -      follow up with PCP    Follow up in about 1 year (around 11/5/2025) for Annual Exam.  Karey Morales, APRN-CNM, APRN-CNP

## 2024-11-18 LAB
CYTOLOGY CMNT CVX/VAG CYTO-IMP: NORMAL
HPV HR 12 DNA GENITAL QL NAA+PROBE: NEGATIVE
HPV HR GENOTYPES PNL CVX NAA+PROBE: NEGATIVE
HPV16 DNA SPEC QL NAA+PROBE: NEGATIVE
HPV18 DNA SPEC QL NAA+PROBE: NEGATIVE
LAB AP CONTRACEPTIVE HISTORY: NORMAL
LAB AP HPV GENOTYPE QUESTION: YES
LAB AP HPV HR: NORMAL
LAB AP PREVIOUS ABNORMAL HISTORY: NORMAL
LABORATORY COMMENT REPORT: NORMAL
PATH REPORT.TOTAL CANCER: NORMAL

## 2024-11-22 ENCOUNTER — APPOINTMENT (OUTPATIENT)
Dept: PRIMARY CARE | Facility: CLINIC | Age: 34
End: 2024-11-22
Payer: COMMERCIAL

## 2025-01-07 ENCOUNTER — LAB (OUTPATIENT)
Dept: LAB | Facility: LAB | Age: 35
End: 2025-01-07
Payer: COMMERCIAL

## 2025-01-07 ENCOUNTER — APPOINTMENT (OUTPATIENT)
Dept: PRIMARY CARE | Facility: CLINIC | Age: 35
End: 2025-01-07
Payer: COMMERCIAL

## 2025-01-07 VITALS
WEIGHT: 141.6 LBS | OXYGEN SATURATION: 98 % | TEMPERATURE: 96.8 F | BODY MASS INDEX: 25.09 KG/M2 | HEIGHT: 63 IN | HEART RATE: 100 BPM | DIASTOLIC BLOOD PRESSURE: 79 MMHG | SYSTOLIC BLOOD PRESSURE: 115 MMHG

## 2025-01-07 DIAGNOSIS — Z13.220 LIPID SCREENING: ICD-10-CM

## 2025-01-07 DIAGNOSIS — E04.9 GOITER: ICD-10-CM

## 2025-01-07 DIAGNOSIS — M54.50 LOW BACK PAIN WITHOUT SCIATICA, UNSPECIFIED BACK PAIN LATERALITY, UNSPECIFIED CHRONICITY: ICD-10-CM

## 2025-01-07 DIAGNOSIS — F43.0 ACUTE REACTION TO STRESS: ICD-10-CM

## 2025-01-07 DIAGNOSIS — Z11.3 SCREENING EXAMINATION FOR STD (SEXUALLY TRANSMITTED DISEASE): ICD-10-CM

## 2025-01-07 DIAGNOSIS — Z30.42 ENCOUNTER FOR DEPO-PROVERA CONTRACEPTION: Primary | ICD-10-CM

## 2025-01-07 DIAGNOSIS — Z23 IMMUNIZATION DUE: ICD-10-CM

## 2025-01-07 LAB
CHOLEST SERPL-MCNC: 179 MG/DL (ref 0–199)
CHOLESTEROL/HDL RATIO: 3.1
HCV AB SER QL: NONREACTIVE
HDLC SERPL-MCNC: 56.9 MG/DL
HIV 1+2 AB+HIV1 P24 AG SERPL QL IA: NONREACTIVE
LDLC SERPL CALC-MCNC: 107 MG/DL
NON HDL CHOLESTEROL: 122 MG/DL (ref 0–149)
TREPONEMA PALLIDUM IGG+IGM AB [PRESENCE] IN SERUM OR PLASMA BY IMMUNOASSAY: NONREACTIVE
TRIGL SERPL-MCNC: 75 MG/DL (ref 0–149)
TSH SERPL-ACNC: 1.11 MIU/L (ref 0.44–3.98)
VLDL: 15 MG/DL (ref 0–40)

## 2025-01-07 PROCEDURE — 87389 HIV-1 AG W/HIV-1&-2 AB AG IA: CPT

## 2025-01-07 PROCEDURE — 80061 LIPID PANEL: CPT

## 2025-01-07 PROCEDURE — 3008F BODY MASS INDEX DOCD: CPT

## 2025-01-07 PROCEDURE — 1036F TOBACCO NON-USER: CPT

## 2025-01-07 PROCEDURE — 96372 THER/PROPH/DIAG INJ SC/IM: CPT

## 2025-01-07 PROCEDURE — 99214 OFFICE O/P EST MOD 30 MIN: CPT

## 2025-01-07 PROCEDURE — 86780 TREPONEMA PALLIDUM: CPT

## 2025-01-07 PROCEDURE — 99214 OFFICE O/P EST MOD 30 MIN: CPT | Mod: GC,25

## 2025-01-07 PROCEDURE — 90715 TDAP VACCINE 7 YRS/> IM: CPT | Mod: GC

## 2025-01-07 PROCEDURE — 2500000004 HC RX 250 GENERAL PHARMACY W/ HCPCS (ALT 636 FOR OP/ED)

## 2025-01-07 PROCEDURE — 84443 ASSAY THYROID STIM HORMONE: CPT

## 2025-01-07 PROCEDURE — 86803 HEPATITIS C AB TEST: CPT

## 2025-01-07 RX ORDER — ACETAMINOPHEN 500 MG
1 TABLET ORAL DAILY
Qty: 30 TABLET | Refills: 11 | Status: SHIPPED | OUTPATIENT
Start: 2025-01-07 | End: 2026-01-07

## 2025-01-07 RX ORDER — MEDROXYPROGESTERONE ACETATE 150 MG/ML
150 INJECTION, SUSPENSION INTRAMUSCULAR
Status: SHIPPED | OUTPATIENT
Start: 2025-01-07 | End: 2025-09-16

## 2025-01-07 RX ORDER — MEDROXYPROGESTERONE ACETATE 150 MG/ML
150 INJECTION, SUSPENSION INTRAMUSCULAR
Qty: 1 ML | Refills: 3 | Status: SHIPPED | OUTPATIENT
Start: 2025-01-07 | End: 2025-01-07 | Stop reason: SDUPTHER

## 2025-01-07 RX ADMIN — MEDROXYPROGESTERONE ACETATE 150 MG: 150 INJECTION, SUSPENSION INTRAMUSCULAR at 16:33

## 2025-01-07 ASSESSMENT — PAIN SCALES - GENERAL: PAINLEVEL_OUTOF10: 7
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
## 2025-01-10 ENCOUNTER — APPOINTMENT (OUTPATIENT)
Dept: PRIMARY CARE | Facility: CLINIC | Age: 35
End: 2025-01-10
Payer: COMMERCIAL

## 2025-02-13 ENCOUNTER — OFFICE VISIT (OUTPATIENT)
Dept: OBSTETRICS AND GYNECOLOGY | Facility: HOSPITAL | Age: 35
End: 2025-02-13
Payer: COMMERCIAL

## 2025-02-13 DIAGNOSIS — D27.1 DERMOID CYST OF LEFT OVARY: Primary | ICD-10-CM

## 2025-02-13 PROCEDURE — 99213 OFFICE O/P EST LOW 20 MIN: CPT | Performed by: OBSTETRICS & GYNECOLOGY

## 2025-02-13 ASSESSMENT — ENCOUNTER SYMPTOMS
HEMATURIA: 0
NAUSEA: 0
FREQUENCY: 0
VOMITING: 0
COUGH: 0
SHORTNESS OF BREATH: 0
PALPITATIONS: 0
WEAKNESS: 0
ABDOMINAL PAIN: 0
CHILLS: 0
DIARRHEA: 0
HEADACHES: 0
CONSTIPATION: 0
FEVER: 0
DIZZINESS: 0
DYSURIA: 0

## 2025-02-13 NOTE — PROGRESS NOTES
SUBJECTIVE    34 y.o.  Injection presents for   Chief Complaint   Patient presents with    Ovarian Cyst     Patient here for ovarian cyst  Seen in ED 25 for hematuria noted to have ovarian cyst  Lmp depo  Last pap 24         Patient here for follow up of incidental left ovarian cyst likely teratoma seen on CT scan.    OB/GYN History  No LMP recorded (lmp unknown). Patient has had an injection.    Social History     Substance and Sexual Activity   Sexual Activity Yes    Partners: Male    Birth control/protection: Injection       Sexually transmitted infections:no past history    OB History    Para Term  AB Living   1 1   1   1   SAB IAB Ectopic Multiple Live Births                  # Outcome Date GA Lbr Ryan/2nd Weight Sex Type Anes PTL Lv   1  04/29/10    M Vag-Spont EPI Y        The following portions of the chart were reviewed this encounter and updated as appropriate:           Screenings  Social Drivers of Health     Tobacco Use: Low Risk  (2025)    Patient History     Smoking Tobacco Use: Never     Smokeless Tobacco Use: Never     Passive Exposure: Not on file   Alcohol Use: Not on file   Financial Resource Strain: Not on file   Food Insecurity: Unknown (2025)    [facility]    Hunger Vital Sign     Worried About Running Out of Food in the Last Year: Never true     Ran Out of Food in the Last Year: Not on file   Transportation Needs: Not on file   Physical Activity: Not on file   Stress: No Stress Concern Present (2024)    [employer] - Occupational Stress Questionnaire     Feeling of Stress : Not at all   Social Connections: Not on file   Intimate Partner Violence: Not At Risk (2024)    Humiliation, Afraid, Rape, and Kick questionnaire     Fear of Current or Ex-Partner: No     Emotionally Abused: No     Physically Abused: No     Sexually Abused: No   Depression: Not at risk (2024)    PHQ-2     PHQ-2  Score: 0   Housing Stability: Not on file   Utilities: Not on file   Digital Equity: Not on file   Health Literacy: Not on file         Review of Systems  Review of Systems   Constitutional:  Negative for chills and fever.   Eyes:  Negative for visual disturbance.   Respiratory:  Negative for cough and shortness of breath.    Cardiovascular:  Negative for chest pain and palpitations.   Gastrointestinal:  Negative for abdominal pain, constipation, diarrhea, nausea and vomiting.   Genitourinary:  Negative for dyspareunia, dysuria, frequency, hematuria, urgency, vaginal bleeding and vaginal discharge.   Neurological:  Negative for dizziness, weakness and headaches.        OBJECTIVE  There were no vitals filed for this visit.  There is no height or weight on file to calculate BMI.     Physical Exam  Constitutional:       Appearance: Normal appearance.   HENT:      Head: Normocephalic and atraumatic.      Nose: Nose normal.      Mouth/Throat:      Mouth: Mucous membranes are moist.   Eyes:      Extraocular Movements: Extraocular movements intact.      Pupils: Pupils are equal, round, and reactive to light.   Cardiovascular:      Rate and Rhythm: Normal rate.   Pulmonary:      Effort: Pulmonary effort is normal.   Musculoskeletal:         General: Normal range of motion.      Cervical back: Normal range of motion.   Neurological:      General: No focal deficit present.      Mental Status: She is alert and oriented to person, place, and time.   Skin:     General: Skin is warm and dry.   Psychiatric:         Mood and Affect: Mood normal.         Behavior: Behavior normal.   Vitals and nursing note reviewed.          Last Pap: approximate date 2/13/25 and was normal    Immunization History   Administered Date(s) Administered    HPV, Quadrivalent 01/23/2007    Tdap vaccine, age 7 year and older (BOOSTRIX, ADACEL) 01/07/2025       ASSESSMENT & PLAN  Problem List Items Addressed This Visit       Dermoid cyst of left ovary -  Primary    Overview     - Reviewed that ovarian teratomas are the most common and generally benign ovarian germ cell tumors in the majority of cases, especially in younger women  - Reviewed that these can lead to pain and not uncommonly ovarian torsion  -  We discussed that surgical removal once diagnosed is generally recommended given with the goal to preserve ovarian tissue by avoiding complications such as torsion, rupture and malignancy (usually more common in women age >45, tumor size >10cm, rapid growth or worrisome imaging findings).   - Patient is hesitant about a surgical procedure - will obtain ultrasound to further characterize and follow up in 4-6 weeks         Relevant Orders    US PELVIS TRANSABDOMINAL WITH TRANSVAGINAL       Follow up: 4-6 weeks    Sadia Brizuela MD  Obstetrics & Gynecology  02/13/25

## 2025-02-26 ENCOUNTER — APPOINTMENT (OUTPATIENT)
Dept: PHYSICAL THERAPY | Facility: HOSPITAL | Age: 35
End: 2025-02-26
Payer: COMMERCIAL

## 2025-03-06 ENCOUNTER — APPOINTMENT (OUTPATIENT)
Dept: RADIOLOGY | Facility: HOSPITAL | Age: 35
End: 2025-03-06
Payer: COMMERCIAL

## 2025-03-13 ENCOUNTER — HOSPITAL ENCOUNTER (OUTPATIENT)
Dept: RADIOLOGY | Facility: HOSPITAL | Age: 35
Discharge: HOME | End: 2025-03-13
Payer: COMMERCIAL

## 2025-03-13 ENCOUNTER — OFFICE VISIT (OUTPATIENT)
Dept: OBSTETRICS AND GYNECOLOGY | Facility: HOSPITAL | Age: 35
End: 2025-03-13
Payer: COMMERCIAL

## 2025-03-13 ENCOUNTER — APPOINTMENT (OUTPATIENT)
Dept: RADIOLOGY | Facility: HOSPITAL | Age: 35
End: 2025-03-13
Payer: COMMERCIAL

## 2025-03-13 ENCOUNTER — APPOINTMENT (OUTPATIENT)
Dept: OBSTETRICS AND GYNECOLOGY | Facility: HOSPITAL | Age: 35
End: 2025-03-13
Payer: COMMERCIAL

## 2025-03-13 VITALS
WEIGHT: 135.6 LBS | BODY MASS INDEX: 24.41 KG/M2 | SYSTOLIC BLOOD PRESSURE: 134 MMHG | DIASTOLIC BLOOD PRESSURE: 79 MMHG | HEART RATE: 93 BPM

## 2025-03-13 DIAGNOSIS — D27.1 DERMOID CYST OF LEFT OVARY: ICD-10-CM

## 2025-03-13 PROCEDURE — 76856 US EXAM PELVIC COMPLETE: CPT | Performed by: RADIOLOGY

## 2025-03-13 PROCEDURE — 76830 TRANSVAGINAL US NON-OB: CPT | Performed by: RADIOLOGY

## 2025-03-13 PROCEDURE — 76830 TRANSVAGINAL US NON-OB: CPT

## 2025-03-13 ASSESSMENT — ENCOUNTER SYMPTOMS
EYES NEGATIVE: 0
PSYCHIATRIC NEGATIVE: 0
CARDIOVASCULAR NEGATIVE: 0
GASTROINTESTINAL NEGATIVE: 0
HEMATOLOGIC/LYMPHATIC NEGATIVE: 0
ENDOCRINE NEGATIVE: 0
MUSCULOSKELETAL NEGATIVE: 0
NEUROLOGICAL NEGATIVE: 0
CONSTITUTIONAL NEGATIVE: 0
RESPIRATORY NEGATIVE: 0
ALLERGIC/IMMUNOLOGIC NEGATIVE: 0

## 2025-03-13 ASSESSMENT — PATIENT HEALTH QUESTIONNAIRE - PHQ9
SUM OF ALL RESPONSES TO PHQ9 QUESTIONS 1 AND 2: 0
1. LITTLE INTEREST OR PLEASURE IN DOING THINGS: NOT AT ALL
2. FEELING DOWN, DEPRESSED OR HOPELESS: NOT AT ALL

## 2025-03-13 ASSESSMENT — PAIN SCALES - GENERAL: PAINLEVEL_OUTOF10: 0-NO PAIN

## 2025-03-27 ENCOUNTER — EVALUATION (OUTPATIENT)
Dept: PHYSICAL THERAPY | Facility: HOSPITAL | Age: 35
End: 2025-03-27
Payer: COMMERCIAL

## 2025-03-27 DIAGNOSIS — M54.50 LOW BACK PAIN WITHOUT SCIATICA, UNSPECIFIED BACK PAIN LATERALITY, UNSPECIFIED CHRONICITY: Primary | ICD-10-CM

## 2025-03-27 PROCEDURE — 97161 PT EVAL LOW COMPLEX 20 MIN: CPT | Mod: GP

## 2025-03-27 PROCEDURE — 97110 THERAPEUTIC EXERCISES: CPT | Mod: GP

## 2025-03-27 ASSESSMENT — ENCOUNTER SYMPTOMS
DEPRESSION: 0
OCCASIONAL FEELINGS OF UNSTEADINESS: 0
LOSS OF SENSATION IN FEET: 0
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
## 2025-03-28 ENCOUNTER — OFFICE VISIT (OUTPATIENT)
Facility: HOSPITAL | Age: 35
End: 2025-03-28
Payer: COMMERCIAL

## 2025-03-28 VITALS
WEIGHT: 141.6 LBS | BODY MASS INDEX: 26.06 KG/M2 | TEMPERATURE: 96.9 F | HEIGHT: 62 IN | DIASTOLIC BLOOD PRESSURE: 85 MMHG | OXYGEN SATURATION: 100 % | HEART RATE: 91 BPM | SYSTOLIC BLOOD PRESSURE: 118 MMHG

## 2025-03-28 DIAGNOSIS — R31.9 HEMATURIA, UNSPECIFIED TYPE: ICD-10-CM

## 2025-03-28 DIAGNOSIS — F43.0 ACUTE REACTION TO STRESS: ICD-10-CM

## 2025-03-28 DIAGNOSIS — N89.8 VAGINAL DISCHARGE: ICD-10-CM

## 2025-03-28 DIAGNOSIS — N76.0 BACTERIAL VAGINOSIS: ICD-10-CM

## 2025-03-28 DIAGNOSIS — Z30.42 ENCOUNTER FOR DEPO-PROVERA CONTRACEPTION: Primary | ICD-10-CM

## 2025-03-28 DIAGNOSIS — B37.9 YEAST INFECTION: ICD-10-CM

## 2025-03-28 DIAGNOSIS — B96.89 BACTERIAL VAGINOSIS: ICD-10-CM

## 2025-03-28 LAB
POC CLUE CELL WET PREP: PRESENT
POC TRICHOMONAS WET PREP: ABNORMAL
POC WBC WET PREP: ABNORMAL
POC YEAST CELLS WET PREP: ABNORMAL

## 2025-03-28 PROCEDURE — 87210 SMEAR WET MOUNT SALINE/INK: CPT

## 2025-03-28 PROCEDURE — 2500000004 HC RX 250 GENERAL PHARMACY W/ HCPCS (ALT 636 FOR OP/ED)

## 2025-03-28 PROCEDURE — 96372 THER/PROPH/DIAG INJ SC/IM: CPT

## 2025-03-28 PROCEDURE — 99213 OFFICE O/P EST LOW 20 MIN: CPT | Mod: GE,25

## 2025-03-28 RX ORDER — MEDROXYPROGESTERONE ACETATE 150 MG/ML
150 INJECTION, SUSPENSION INTRAMUSCULAR ONCE
Status: COMPLETED | OUTPATIENT
Start: 2025-03-28 | End: 2025-03-28

## 2025-03-28 RX ORDER — METRONIDAZOLE 500 MG/1
500 TABLET ORAL 2 TIMES DAILY
Qty: 14 TABLET | Refills: 0 | Status: SHIPPED | OUTPATIENT
Start: 2025-03-28 | End: 2025-04-04

## 2025-03-28 RX ORDER — CHOLECALCIFEROL (VITAMIN D3) 50 MCG
1 CAPSULE ORAL DAILY
Qty: 90 TABLET | Refills: 3 | Status: SHIPPED | OUTPATIENT
Start: 2025-03-28

## 2025-03-28 RX ORDER — FLUCONAZOLE 150 MG/1
150 TABLET ORAL ONCE
Qty: 1 TABLET | Refills: 0 | Status: SHIPPED | OUTPATIENT
Start: 2025-03-28 | End: 2025-03-28

## 2025-03-28 RX ADMIN — MEDROXYPROGESTERONE ACETATE 150 MG: 150 INJECTION, SUSPENSION INTRAMUSCULAR at 16:47

## 2025-03-28 ASSESSMENT — PATIENT HEALTH QUESTIONNAIRE - PHQ9
2. FEELING DOWN, DEPRESSED OR HOPELESS: NOT AT ALL
1. LITTLE INTEREST OR PLEASURE IN DOING THINGS: NOT AT ALL
SUM OF ALL RESPONSES TO PHQ9 QUESTIONS 1 AND 2: 0

## 2025-03-28 ASSESSMENT — PAIN SCALES - GENERAL: PAINLEVEL_OUTOF10: 0-NO PAIN

## 2025-03-28 ASSESSMENT — COLUMBIA-SUICIDE SEVERITY RATING SCALE - C-SSRS
2. HAVE YOU ACTUALLY HAD ANY THOUGHTS OF KILLING YOURSELF?: NO
1. IN THE PAST MONTH, HAVE YOU WISHED YOU WERE DEAD OR WISHED YOU COULD GO TO SLEEP AND NOT WAKE UP?: NO
6. HAVE YOU EVER DONE ANYTHING, STARTED TO DO ANYTHING, OR PREPARED TO DO ANYTHING TO END YOUR LIFE?: NO

## 2025-03-28 ASSESSMENT — ENCOUNTER SYMPTOMS
OCCASIONAL FEELINGS OF UNSTEADINESS: 0
DEPRESSION: 0
LOSS OF SENSATION IN FEET: 0

## 2025-03-28 NOTE — PROGRESS NOTES
"Subjective   Patient ID: Jarrett Manriquez is a 34 y.o. female who presents for Follow-up.    HPI     # vaginal discharge   - pt endorses thick white discharge  - endorses change in smell   - denies abdominal pain, fevers, or chills.   - denies dyspreunia.     # contraception   - pt has tried IUD, OCPs, and nexplanon  in the past but was not able to tolerate.   - has been on depo-provera for 1+ years. She has no concerns about it and no adverse effects.     Review of Systems  12 point ROS negative except as stated in HPI.     Objective   /85 (BP Location: Right arm, Patient Position: Sitting, BP Cuff Size: Adult)   Pulse 91   Temp 36.1 °C (96.9 °F) (Temporal)   Ht 1.575 m (5' 2\")   Wt 64.2 kg (141 lb 9.6 oz)   SpO2 100%   BMI 25.90 kg/m²     Physical Exam  Gen: NAD, nontox  HEENT: NCAT. MMM.  RESP: no resp distress, talks in full sentences, CTABL  CVS: non-tachycardic, RRR  ABD: Soft, non-distended, no tenderness to palpation   Psych: appropriately answers questions. normal mood and affect  MSK: appears to have Full range of motion on all extremities.  : thick white discharge with no deformities in vaginal canal,  no lesions at vaginal introitus.     Assessment/Plan   ANTONIO Manriquez is a 35 yo w/ no PMHx who presents with vaginal discharge. She believes it may be BV or yeast. We did a wet mount in clinic which was +ve for clue cells. Pt was given a script for flagyl and as pt states that she gets yeast infections regularly with antibiotic use, pt was given one time dose of diflucan to be taken if pt has sx of yeast infection. Discussed probiotic and washing vaginal region with non-fragrant soaps to prevent BV and candida.     Problem List Items Addressed This Visit       Bacterial vaginosis  Speclum exam with thick white discharge. On wet mount, clue cells seen.   - start metronidazole 500 mg BID x 7 days for BV  - pt counseled on good gut health to prevent BV. Pt provided script for probiotics.     " Relevant Medications    metroNIDAZOLE (Flagyl) 500 mg tablet    B.animalis,bifid,infantis,long (Probiotic 4X) 10-15 mg tablet,delayed release (DR/EC)     Other Visit Diagnoses       Encounter for Depo-Provera contraception    -  Primary  Pt doing well on depo provera with no side effects.   Pt has not tolerated other forms of birth control   - administer depo provera today ( 3/28/25)     Relevant Medications    medroxyPROGESTERone (Depo-Provera) injection 150 mg (Completed)    Hematuria, unspecified type      Brown discharge on wiping   - will order uA to assess for blood in urine     Relevant Orders    Urinalysis with Reflex Microscopic    Yeast infection      Pt states that she has yeast infections after abx regimen.     Relevant Medications    fluconazole (Diflucan) 150 mg tablet    Vaginal discharge      - will obtain STI testing     Relevant Orders    C. trachomatis / N. gonorrhoeae, Amplified, Urogenital    Syphilis Screen with Reflex    HIV 1/2 Antigen/Antibody Screen with Reflex to Confirmation    Trichomonas vaginalis, Amplified    POCT Wet Mount manually resulted (Completed)     RTC in 3 months for HM     Discussed with Dr. Lowell Lowery MD, MPH   Family Medicine and Preventive Health, PGY-3

## 2025-03-28 NOTE — PROGRESS NOTES
I reviewed the resident/fellow's documentation and discussed the patient with the resident/fellow. I agree with the resident/fellow's medical decision making as documented in the note. Given WBCs on wet prep, agree with excluding GC/chlamydia. I discussed but did not see the patient consistent with the Primary Care Exception.       Brigida Etienne MD

## 2025-03-29 LAB
APPEARANCE UR: ABNORMAL
BACTERIA #/AREA URNS HPF: ABNORMAL /HPF
BILIRUB UR QL STRIP: NEGATIVE
C TRACH RRNA SPEC QL NAA+PROBE: NORMAL
COLOR UR: YELLOW
GLUCOSE UR QL STRIP: NEGATIVE
HGB UR QL STRIP: ABNORMAL
HYALINE CASTS #/AREA URNS LPF: ABNORMAL /LPF
KETONES UR QL STRIP: NEGATIVE
LEUKOCYTE ESTERASE UR QL STRIP: ABNORMAL
NITRITE UR QL STRIP: POSITIVE
PH UR STRIP: 6.5 [PH] (ref 5–8)
PROT UR QL STRIP: NEGATIVE
RBC #/AREA URNS HPF: ABNORMAL /HPF
SERVICE CMNT-IMP: ABNORMAL
SP GR UR STRIP: 1.02 (ref 1–1.03)
SQUAMOUS #/AREA URNS HPF: ABNORMAL /HPF
WBC #/AREA URNS HPF: ABNORMAL /HPF

## 2025-03-31 DIAGNOSIS — N30.00 ACUTE CYSTITIS WITHOUT HEMATURIA: Primary | ICD-10-CM

## 2025-03-31 RX ORDER — NITROFURANTOIN 25; 75 MG/1; MG/1
100 CAPSULE ORAL 2 TIMES DAILY
Qty: 10 CAPSULE | Refills: 0 | Status: SHIPPED | OUTPATIENT
Start: 2025-03-31 | End: 2025-04-05

## 2025-04-10 ENCOUNTER — OFFICE VISIT (OUTPATIENT)
Dept: OBSTETRICS AND GYNECOLOGY | Facility: HOSPITAL | Age: 35
End: 2025-04-10
Payer: COMMERCIAL

## 2025-04-10 VITALS — DIASTOLIC BLOOD PRESSURE: 79 MMHG | BODY MASS INDEX: 24.33 KG/M2 | WEIGHT: 133 LBS | SYSTOLIC BLOOD PRESSURE: 138 MMHG

## 2025-04-10 DIAGNOSIS — Z11.3 ROUTINE SCREENING FOR STI (SEXUALLY TRANSMITTED INFECTION): ICD-10-CM

## 2025-04-10 DIAGNOSIS — Z87.42 HISTORY OF ABNORMAL CERVICAL PAP SMEAR: Primary | ICD-10-CM

## 2025-04-10 DIAGNOSIS — D27.1 DERMOID CYST OF LEFT OVARY: ICD-10-CM

## 2025-04-10 PROBLEM — B96.89 BACTERIAL VAGINOSIS: Status: RESOLVED | Noted: 2023-04-05 | Resolved: 2025-04-10

## 2025-04-10 PROBLEM — N76.0 BACTERIAL VAGINOSIS: Status: RESOLVED | Noted: 2023-04-05 | Resolved: 2025-04-10

## 2025-04-10 PROBLEM — B37.31 CANDIDAL VULVOVAGINITIS: Status: RESOLVED | Noted: 2023-04-05 | Resolved: 2025-04-10

## 2025-04-10 PROBLEM — N64.3 GALACTORRHEA: Status: RESOLVED | Noted: 2023-04-05 | Resolved: 2025-04-10

## 2025-04-10 PROBLEM — A56.09 CHLAMYDIA VAGINITIS/CERVICITIS: Status: RESOLVED | Noted: 2023-04-05 | Resolved: 2025-04-10

## 2025-04-10 PROBLEM — A56.02 CHLAMYDIA VAGINITIS/CERVICITIS: Status: RESOLVED | Noted: 2023-04-05 | Resolved: 2025-04-10

## 2025-04-10 PROCEDURE — 99214 OFFICE O/P EST MOD 30 MIN: CPT | Performed by: OBSTETRICS & GYNECOLOGY

## 2025-04-10 PROCEDURE — 1036F TOBACCO NON-USER: CPT | Performed by: OBSTETRICS & GYNECOLOGY

## 2025-04-10 RX ORDER — CELECOXIB 400 MG/1
400 CAPSULE ORAL ONCE
OUTPATIENT
Start: 2025-04-10 | End: 2025-04-10

## 2025-04-10 RX ORDER — GABAPENTIN 600 MG/1
600 TABLET ORAL ONCE
OUTPATIENT
Start: 2025-04-10 | End: 2025-04-10

## 2025-04-10 RX ORDER — ACETAMINOPHEN 325 MG/1
975 TABLET ORAL ONCE
OUTPATIENT
Start: 2025-04-10 | End: 2025-04-10

## 2025-04-10 ASSESSMENT — PAIN SCALES - GENERAL: PAINLEVEL_OUTOF10: 0-NO PAIN

## 2025-04-10 NOTE — ASSESSMENT & PLAN NOTE
- We reviewed the options for management again including surgical management or expectant management with serial ultrasounds  - Patient would like to proceed with left ovarian cystectomy  - Risks and benefits of the procedure were discussed with the patient. The risks include: bleeding, infection, injury to internal organs (including but not limited to: bowel, bladder, ureters, blood vessels, uterus, adnexa), need for further surgery, need to remove the ovary(ies)  - Case request placed

## 2025-04-10 NOTE — PROGRESS NOTES
SUBJECTIVE    34 y.o.  Injection presents for follow up     Overall doing well, asymptomatic.     OB/GYN History  No LMP recorded. Patient has had an injection.    Social History     Substance and Sexual Activity   Sexual Activity Yes    Partners: Male    Birth control/protection: Injection       Sexually transmitted infections: past history chlamydia    OB History    Para Term  AB Living   1 1   1   1   SAB IAB Ectopic Multiple Live Births                  # Outcome Date GA Lbr Ryan/2nd Weight Sex Type Anes PTL Lv   1  04/29/10    M Vag-Spont EPI Y        The following portions of the chart were reviewed this encounter and updated as appropriate:    Tobacco  Allergies  Meds  Problems  Med Hx  Surg Hx  Fam Hx         Screenings  Social Drivers of Health     Tobacco Use: Low Risk  (4/10/2025)    Patient History     Smoking Tobacco Use: Never     Smokeless Tobacco Use: Never     Passive Exposure: Not on file   Alcohol Use: Not on file   Financial Resource Strain: Not on file   Food Insecurity: Unknown (2025)    Received from [facility]    Hunger Vital Sign     Worried About Running Out of Food in the Last Year: Never true     Ran Out of Food in the Last Year: Not on file   Transportation Needs: Not on file   Physical Activity: Not on file   Stress: No Stress Concern Present (2024)    [employer] [place] [employer] - Occupational Stress Questionnaire     Feeling of Stress : Not at all   Social Connections: Not on file   Intimate Partner Violence: Not At Risk (2024)    Humiliation, Afraid, Rape, and Kick questionnaire     Fear of Current or Ex-Partner: No     Emotionally Abused: No     Physically Abused: No     Sexually Abused: No   Depression: Not at risk (3/28/2025)    PHQ-2     PHQ-2 Score: 0   Housing Stability: Not on file   Utilities: Not on file   Digital Equity: Not on file   Health Literacy: Not on file       OBJECTIVE  Vitals:    04/10/25 0905    BP: 138/79   Weight: 60.3 kg (133 lb)     Body mass index is 24.33 kg/m².     Physical Exam  Constitutional:       Appearance: Normal appearance.   HENT:      Head: Normocephalic and atraumatic.      Nose: Nose normal.      Mouth/Throat:      Mouth: Mucous membranes are moist.   Eyes:      Extraocular Movements: Extraocular movements intact.      Pupils: Pupils are equal, round, and reactive to light.   Cardiovascular:      Rate and Rhythm: Normal rate.   Pulmonary:      Effort: Pulmonary effort is normal.   Musculoskeletal:         General: Normal range of motion.      Cervical back: Normal range of motion.   Neurological:      General: No focal deficit present.      Mental Status: She is alert and oriented to person, place, and time.   Skin:     General: Skin is warm and dry.   Psychiatric:         Mood and Affect: Mood normal.         Behavior: Behavior normal.   Vitals and nursing note reviewed.          Last Pap: approximate date 2024 and was normal    Immunization History   Administered Date(s) Administered    HPV, Quadrivalent 01/23/2007    Tdap vaccine, age 7 year and older (BOOSTRIX, ADACEL) 01/07/2025      Gardasil received    ASSESSMENT & PLAN  Problem List Items Addressed This Visit          Ob-Gyn Problems    History of abnormal cervical Pap smear - Primary    Overview     - 2022: ASCUS HPV pos  - 2024: NILM HPV neg  - Due 11/2025            Other    Dermoid cyst of left ovary    Overview     - Reviewed that ovarian teratomas are the most common and generally benign ovarian germ cell tumors in the majority of cases, especially in younger women  - Reviewed that these can lead to pain and not uncommonly ovarian torsion  -  We discussed that surgical removal once diagnosed is generally recommended given with the goal to preserve ovarian tissue by avoiding complications such as torsion, rupture and malignancy (usually more common in women age >45, tumor size >10cm, rapid growth or worrisome imaging  findings).   - Patient is hesitant about a surgical procedure - will obtain ultrasound to further characterize and follow up in 4-6 weeks         Current Assessment & Plan     - We reviewed the options for management again including surgical management or expectant management with serial ultrasounds  - Patient would like to proceed with left ovarian cystectomy  - Risks and benefits of the procedure were discussed with the patient. The risks include: bleeding, infection, injury to internal organs (including but not limited to: bowel, bladder, ureters, blood vessels, uterus, adnexa), need for further surgery, need to remove the ovary(ies)  - Case request placed         Relevant Orders    Case Request Operating Room: EXCISION, CYST, OVARY, LAPAROSCOPIC (Completed)    Type And Screen Is this order related to pregnancy or an upcoming surgery? Yes; Where will this surgery/delivery be performed? [facility]; What is the date of the surgery? 5/30/2025; Has this patient ever had a transfusion? Unknown; ...    CBC     Other Visit Diagnoses       Routine screening for STI (sexually transmitted infection)        Relevant Orders    Trichomonas vaginalis, Amplified    C. trachomatis / N. gonorrhoeae, Amplified, Urogenital            Follow up: For surgery    Sadia Brizuela MD  Obstetrics & Gynecology  04/10/25

## 2025-04-11 LAB
C TRACH RRNA SPEC QL NAA+PROBE: NOT DETECTED
N GONORRHOEA RRNA SPEC QL NAA+PROBE: NOT DETECTED
QUEST GC CT AMPLIFIED (ALWAYS MESSAGE): NORMAL
T VAGINALIS RRNA SPEC QL NAA+PROBE: NOT DETECTED

## 2025-04-15 ENCOUNTER — TREATMENT (OUTPATIENT)
Dept: PHYSICAL THERAPY | Facility: HOSPITAL | Age: 35
End: 2025-04-15
Payer: COMMERCIAL

## 2025-04-15 DIAGNOSIS — M54.50 LOW BACK PAIN WITHOUT SCIATICA, UNSPECIFIED BACK PAIN LATERALITY, UNSPECIFIED CHRONICITY: ICD-10-CM

## 2025-04-15 PROCEDURE — 97110 THERAPEUTIC EXERCISES: CPT | Mod: GP

## 2025-04-15 NOTE — PROGRESS NOTES
Physical Therapy Treatment    Patient Name:Jarrett Manriquez  MRN:[id number]  Today's Date:4/15/2025  Referred by: Delicia Cartagena  Time Calculation  Start Time: 1520  Stop Time: 1603  Time Calculation (min): 43 min    Therapy Diagnosis  Problem List Items Addressed This Visit    None  Visit Diagnoses         Codes    Low back pain without sciatica, unspecified back pain laterality, unspecified chronicity     M54.50          Insurance  Visit number: 2  Approved number of visits: 30  Auth required: No  Onset Date: 1/7/2025  Payor: [employer]OURCE / Plan: [employer]ESOURCE / Product Type: *No Product type* /   Precautions/Fall Risk:  Fall Risk: None based on fell within last year due to push and noting no LOB or fall risk  Pacemaker: no    Seizures: No    Post Op Movement/Restrictions: No:    Subjective/Pain: Pt notes she still has some pain in low back, however noting is more aware with her posture throughout the day noting she will slouch more. Pt notes coming in to today's session is feeling more low back tightness and no pain. Pt notes she had a shooting pain from her low back to her buttocks when lifting a couch to move it the other day. Pt notes overall her pain has not changed with activities, however notes that the HEP helps to lower her pain.   Current Pain Level (0-10): 0    HEP Compliance: Good    Objective/Outcome Measures:  No objective measures taken today's visit  Treatment  Therapeutic Procedure PT Therapeutic Procedures Time Entry  Therapeutic Exercise Time Entry: 43 minutes  Upright bike lvl 2 x8 min  Prone lying 2 min  Prone prop 2 min  Prone press ups 2x10  TRX squats x15   3-way hip resisted red loop x10 ea  Rows Red 2x10  Pull downs 30# 2x10    Updated HEP:   Access Code: [email or web address]  URL: [email or web address]  Date: 04/15/2025  Prepared by: Tanna Yu    Exercises  - Lying Prone  - 2 x daily - 7 x weekly - 1 sets - 2 min hold  - Static Prone on Elbows  - 2 x daily - 7 x weekly - 1  sets - 2 min hold  - Prone Press Up  - 5-6 x daily - 7 x weekly - 1 sets - 10 reps  - Standing Lumbar Extension  - 5-6 x daily - 7 x weekly - 1 sets - 10 reps  - Standing Hip Hinge with Dowel  - 1 x daily - 7 x weekly - 2 sets - 10 reps  - Hip Abduction with Resistance Loop  - 1 x daily - 3-4 x weekly - 3 sets - 10 reps  - Hip Extension with Resistance Loop  - 1 x daily - 3-4 x weekly - 3 sets - 10 reps  - Standing Hip Flexion with Resistance Loop  - 1 x daily - 3-4 x weekly - 3 sets - 10 reps    Assessment:     Pt presents to today's session with minimal complaints of low back discomfort/pain. Pt experiences increased pressure/tightness with extension based exercises however no increase in familiar s/s or LBP. Pt responded well to TRX squats and rows with decrease in muscle tightness. Pt reported feeling muscle knots release with resisted rows. Pt overall tolerated today's session very well with decrease in low back tightness and muscle tightness with exercise tasks. Pt will benefit from continued extension based exercises, postural strengthening and functional squats and lifting mechanics.     Plan for next session:  Continue with extension based exercises as tolerated and progress with prone press ups with self OP. Progress with functional squatting and lifting, kettle bell lifts at steps, golfers lift. Progress with core strengthening as tolerated and centralized low back pain.

## 2025-04-22 ENCOUNTER — TREATMENT (OUTPATIENT)
Dept: PHYSICAL THERAPY | Facility: HOSPITAL | Age: 35
End: 2025-04-22
Payer: COMMERCIAL

## 2025-04-22 DIAGNOSIS — M54.50 LOW BACK PAIN WITHOUT SCIATICA, UNSPECIFIED BACK PAIN LATERALITY, UNSPECIFIED CHRONICITY: ICD-10-CM

## 2025-04-22 PROCEDURE — 97110 THERAPEUTIC EXERCISES: CPT | Mod: GP

## 2025-04-22 NOTE — PROGRESS NOTES
Physical Therapy Treatment    Patient Name:Jarrett Manriquez  MRN:[id number]  Today's Date:4/22/2025  Referred by: Delicia Cartagena  Time Calculation  Start Time: 1514  Stop Time: 1600  Time Calculation (min): 46 min    Therapy Diagnosis  Problem List Items Addressed This Visit    None  Visit Diagnoses         Codes      Low back pain without sciatica, unspecified back pain laterality, unspecified chronicity     M54.50          Insurance  Visit number: 3  Approved number of visits: 30  Auth required: No  Onset Date: 1/7/2025  Payor: [employer]ESOURCE / Plan: CARESOURCE / Product Type: *No Product type* /   Precautions/Fall Risk:  Fall Risk: None based on fell within last year due to push and noting no LOB or fall risk  Pacemaker: no    Seizures: No    Post Op Movement/Restrictions: No:    Subjective/Pain: Pt reports no pain coming into today's session. Notes she did do a workout earlier today. Notes her back pain has still be there, however notes she has not gotten the sharp discomfort since last session. Notes she has more awareness of her posture or not bending at her low back with activities, however states she finds herself still slouching at times.   Current Pain Level (0-10): 0    HEP Compliance: Excellent    Objective/Outcome Measures:  No objective measures taken today's visit.   Treatment  Therapeutic Procedure PT Therapeutic Procedures Time Entry  Therapeutic Exercise Time Entry: 46 minutes  Upright bike lvl 2 x6 min  Standing lumbar ext prn x10  Daisy pose <>cat/cow <>cobra/upward dog  TRX squats x10  5# KB squat at step x10  Golfers lift 5#KB x15 ea  Lateral band walks/monster walks 2x10'  Rows Green 2x15  Pull downs 25# 2x15  Palloff press red 2x10 ea  -updated HEP     Not today:   Prone lying 2 min  Prone prop 2 min  Prone press ups 2x10  3-way hip resisted red loop x10 ea    Access Code: [email or web address]  URL: [email or web address]  Date: 04/22/2025  Prepared by: Tanna  Yu      Exercises  - Lying Prone  - 2 x daily - 7 x weekly - 1 sets - 2 min hold  - Static Prone on Elbows  - 2 x daily - 7 x weekly - 1 sets - 2 min hold  - Prone Press Up  - 5-6 x daily - 7 x weekly - 1 sets - 10 reps  - Standing Lumbar Extension  - 5-6 x daily - 7 x weekly - 1 sets - 10 reps  - Standing Hip Hinge with Dowel  - 1 x daily - 7 x weekly - 2 sets - 10 reps  - Hip Abduction with Resistance Loop  - 1 x daily - 3-4 x weekly - 3 sets - 10 reps  - Hip Extension with Resistance Loop  - 1 x daily - 3-4 x weekly - 3 sets - 10 reps  - Standing Hip Flexion with Resistance Loop  - 1 x daily - 3-4 x weekly - 3 sets - 10 reps  - Standing Shoulder Row with Anchored Resistance  - 1 x daily - 3-4 x weekly - 2 sets - 10 reps  - Single Arm Shoulder Extension with Anchored Resistance  - 1 x daily - 3-4 x weekly - 2 sets - 10 reps  - Standing Anti-Rotation Press with Anchored Resistance  - 1 x daily - 3-4 x weekly - 2 sets - 10 reps    Assessment:     Pt presents to today's session with minimal to no complaints of familiar s/s. Pt overall responded to today's session very well without any complaints of increase in familiar s/s. Pt challenged by core exercises noting to feel more muscle soreness/weakness with exercise tasks. Pt required slight verbal cues for proper posturing and neutral spine with squats and golfers lift. Pt reported to feel muscles release in lower back with today's exercises and feels like PT is helping. Pt also reports that she has been able to manage and decrease some daily stressors that has helped decrease overall pain.     Plan for next session:  Continue with ext based exercises, progress with increased reps/resistance as tolerated. Progress with bird dog or dead bug progressions as tolerated. Progress with walking lunges with KB?

## 2025-05-02 ENCOUNTER — APPOINTMENT (OUTPATIENT)
Dept: PHYSICAL THERAPY | Facility: HOSPITAL | Age: 35
End: 2025-05-02
Payer: COMMERCIAL

## 2025-05-05 ENCOUNTER — TREATMENT (OUTPATIENT)
Dept: PHYSICAL THERAPY | Facility: HOSPITAL | Age: 35
End: 2025-05-05
Payer: COMMERCIAL

## 2025-05-05 DIAGNOSIS — M54.50 LOW BACK PAIN WITHOUT SCIATICA, UNSPECIFIED BACK PAIN LATERALITY, UNSPECIFIED CHRONICITY: Primary | ICD-10-CM

## 2025-05-05 PROCEDURE — 97110 THERAPEUTIC EXERCISES: CPT | Mod: GP

## 2025-05-05 NOTE — PROGRESS NOTES
Physical Therapy Treatment    Patient Name:Jarrett Manriquez  MRN:[id number]  Today's Date:5/5/2025  Referred by: Delicai Cartagena  Time Calculation  Start Time: 1520  Stop Time: 1603  Time Calculation (min): 43 min    Therapy Diagnosis  Problem List Items Addressed This Visit    None  Visit Diagnoses         Codes      Low back pain without sciatica, unspecified back pain laterality, unspecified chronicity    -  Primary M54.50          Insurance  Visit number: 4  Approved number of visits: 30  Auth required: No  Onset Date: 1/7/2025  Payor: [facility] / Plan: [employer] / Product Type: *No Product type* /   Precautions/Fall Risk:  Fall Risk: None based on fell within last year due to push and noting no LOB or fall risk  Pacemaker: no    Seizures: No    Post Op Movement/Restrictions: No:    Subjective/Pain: Pt notes she has been feeling a lot better with her back pain. Notes she has been wearing a waist  and notes she has not had any pain at worst recently.   Current Pain Level (0-10): 0    HEP Compliance: Excellent    Objective/Outcome Measures:  No objective measures taken today's visit.     Treatment  Therapeutic Procedure PT Therapeutic Procedures Time Entry  Therapeutic Exercise Time Entry: 43 minutes   Upright bike lvl 2 x6 min  Standing lumbar ext prn x10  Total gym squats lvl 5 all cords, 2x10  10# KB squat at step 2x10  Golfers lift 10#KB 1x10  Cybex rows 30# 2x10  Pull downs 25# 2x15  Deadbug with SB x10    Not today:   Prone lying 2 min  Prone prop 2 min  Prone press ups 2x10  3-way hip resisted red loop x10 ea  Daisy pose <>cat/cow <>cobra/upward dog  Palloff press red 2x10 ea  Lateral band walks/monster walks 2x10'    Assessment:     Pt presents to today's session with no complaints of familiar s/s of low back pain. Pt tolerated progression of today's exercise well with increase in weight/resistance and reps. Pt experienced no increase of familiar LBP throughout today's exercises. Noted pt to be  challenged with progression of core exercise with dead bug. Pt reports to feel good at end of today's session.     Plan for next session:  Continue with posture, lifting mechanics, increase reps/weight. Progress core as tolerated. Assess progress towards goals? DC?

## 2025-05-13 ENCOUNTER — TREATMENT (OUTPATIENT)
Dept: PHYSICAL THERAPY | Facility: HOSPITAL | Age: 35
End: 2025-05-13
Payer: COMMERCIAL

## 2025-05-13 DIAGNOSIS — M54.50 LOW BACK PAIN WITHOUT SCIATICA, UNSPECIFIED BACK PAIN LATERALITY, UNSPECIFIED CHRONICITY: ICD-10-CM

## 2025-05-13 PROCEDURE — 97110 THERAPEUTIC EXERCISES: CPT | Mod: GP

## 2025-05-13 NOTE — PROGRESS NOTES
Physical Therapy Treatment and Discharge    Patient Name:Jarrett Manriquez  MRN:[id number]  Today's Date:5/13/2025  Referred by: Delicia Cartagena  Time Calculation  Start Time: 1515  Stop Time: 1559  Time Calculation (min): 44 min    Therapy Diagnosis  Problem List Items Addressed This Visit    None  Visit Diagnoses         Codes      Low back pain without sciatica, unspecified back pain laterality, unspecified chronicity     M54.50          Insurance  Visit number: 5  Approved number of visits: 30  Auth required: No  Onset Date: 1/7/2025  [facility] / Plan: [employer] / Product Type: *No Product type* /   Precautions/Fall Risk:  Fall Risk: None based on fell within last year due to push and noting no LOB or fall risk  Pacemaker: no    Seizures: No    Post Op Movement/Restrictions: No:    Subjective/Pain: Pt reports having no pain since last visit. However notes muscle knots that the stretches seem to be helping. Overall the stretches and HEP has helped with familiar s/s.   Current Pain Level (0-10): 0    HEP Compliance: Good    Objective/Outcome Measures:  Other Measures  Oswestry Disablity Index (CARLOS): 5/50 (Initial Eval: 3/50)    Lower Extremity Strength:  Date: eval Myotome RIGHT LEFT   Hip Flexion L1,2 5/5 5/5     Lumbar ROM:  Date: eval Percentage    Flexion 100%    Extension 100%     RIGHT LEFT   Side Bend 100% 100%   Rotation 100% 100%     Posture: pt sits with improved posture with no slouched posture  Neurological symptoms - SLR B, - slump    Treatment  Therapeutic Procedure PT Therapeutic Procedures Time Entry  Therapeutic Exercise Time Entry: 44 minutes    Upright bike lvl 2 x8 min  Standing lumbar ext prn x10  15# KB squat at step 2x10  Golfers lift 10#KB 1x10  Reassessment of subjective, objective, progress towards goals.       Updated HEP  Access Code: [email or web address]  URL: [email or web address]  Date: 05/13/2025  Prepared by: Tanna Yu    Exercises  - Lying Prone  - 2 x  daily - 7 x weekly - 1 sets - 2 min hold  - Static Prone on Elbows  - 2 x daily - 7 x weekly - 1 sets - 2 min hold  - Prone Press Up  - 5-6 x daily - 7 x weekly - 1 sets - 10 reps  - Standing Lumbar Extension  - 5-6 x daily - 7 x weekly - 1 sets - 10 reps  - Child's Pose Stretch  - 1 x daily - 7 x weekly - 2 sets - 30s hold  - Standing Hip Hinge with Dowel  - 1 x daily - 7 x weekly - 2 sets - 10 reps  - Single Leg Balance with Alternating Floor Reaches  - 1 x daily - 3-4 x weekly - 2 sets - 10 reps  - Wall Squat  - 1 x daily - 3-4 x weekly - 2 sets - 10 reps  - Hip Abduction with Resistance Loop  - 1 x daily - 3-4 x weekly - 3 sets - 10 reps  - Hip Extension with Resistance Loop  - 1 x daily - 3-4 x weekly - 3 sets - 10 reps  - Standing Hip Flexion with Resistance Loop  - 1 x daily - 3-4 x weekly - 3 sets - 10 reps  - Standing Shoulder Row with Anchored Resistance  - 1 x daily - 3-4 x weekly - 2 sets - 10 reps  - Single Arm Shoulder Extension with Anchored Resistance  - 1 x daily - 3-4 x weekly - 2 sets - 10 reps  - Standing Anti-Rotation Press with Anchored Resistance  - 1 x daily - 3-4 x weekly - 2 sets - 10 reps  - Bird Dog  - 1 x daily - 3-4 x weekly - 2 sets - 10 reps  - Supine Dead Bug with Leg Extension  - 1 x daily - 3-4 x weekly - 2 sets - 10 reps      Assessment:     Pt presents to today's session with no complaints of familiar back pain/discomfort. Pt demonstrates overall improvement with B LE strength, lumbar ROM, decrease in overall pain currently and at worst. Educated pt on mindfulness of posture and lifting mechanics with getting back to lifting in the gym, however reports no issues or pain in familiar s/s with everyday lifting with ADLs since starting PT. Pt able to tolerate increase in exercise tasks with increased resistance/reps with no increase or complaints of familiar s/s. Pt has met all goals besides outcome measure. Pt is ready for discharge due to meeting 9/10 goals. Educated pt on  maintaining progress with fully updated HEP, educated on when to regress and progress back into program, educated on how to return to therapy with new script if worsens or change in sx.     Goals: Goals set and discussed today.   Patient's Goal for Treatment: Relieve pain and Reduce symptoms -GOAL MET  Lumbar Spine Goals:  By discharge, patient will:  Demonstrate independence with home exercise program. -GOAL MET  Demonstrate proper seated/standing posture for >/= 75% of the time without familiar s/s.  -GOAL MET  Tolerate increased exercise without adverse reaction. -GOAL MET  Increase ROM of lumbar spine to 100% pain free in order to improve the ability to perform essential ADLs -GOAL MET  Increase strength of B LE to 5/5 in order to improve the ability to perform essential ADLs -GOAL MET  Report decrease in pain by >= 2 points to meet MCID -GOAL MET  Decrease score of Modified CARLOS by > 6 points to meet the MCID -NOT MET   Ambulate longer distances and be on feet for work duties without an increase in symptoms -GOAL MET  Be able to perform the ADL and work duties of bending, lifting, reaching without an increase or production of symptoms. -GOAL MET    Plan:  pt to be discharged after today due to meeting 9/10 goals. Pt educated on how to return with new script from doctor if needed.

## 2025-05-22 ENCOUNTER — APPOINTMENT (OUTPATIENT)
Dept: PHYSICAL THERAPY | Facility: HOSPITAL | Age: 35
End: 2025-05-22
Payer: COMMERCIAL

## 2025-05-28 ENCOUNTER — APPOINTMENT (OUTPATIENT)
Dept: PHYSICAL THERAPY | Facility: HOSPITAL | Age: 35
End: 2025-05-28
Payer: COMMERCIAL

## 2025-06-04 ENCOUNTER — APPOINTMENT (OUTPATIENT)
Dept: PHYSICAL THERAPY | Facility: HOSPITAL | Age: 35
End: 2025-06-04
Payer: COMMERCIAL

## 2025-06-11 ENCOUNTER — APPOINTMENT (OUTPATIENT)
Dept: PHYSICAL THERAPY | Facility: HOSPITAL | Age: 35
End: 2025-06-11
Payer: COMMERCIAL

## 2025-06-13 ENCOUNTER — CLINICAL SUPPORT (OUTPATIENT)
Facility: HOSPITAL | Age: 35
End: 2025-06-13
Payer: COMMERCIAL

## 2025-06-13 VITALS — SYSTOLIC BLOOD PRESSURE: 112 MMHG | DIASTOLIC BLOOD PRESSURE: 71 MMHG

## 2025-06-13 DIAGNOSIS — Z30.8 ENCOUNTER FOR OTHER CONTRACEPTIVE MANAGEMENT: ICD-10-CM

## 2025-06-13 PROCEDURE — 2500000004 HC RX 250 GENERAL PHARMACY W/ HCPCS (ALT 636 FOR OP/ED): Mod: JZ

## 2025-06-13 PROCEDURE — 96372 THER/PROPH/DIAG INJ SC/IM: CPT

## 2025-06-13 RX ORDER — MEDROXYPROGESTERONE ACETATE 150 MG/ML
150 INJECTION, SUSPENSION INTRAMUSCULAR ONCE
Status: CANCELLED | OUTPATIENT
Start: 2025-06-13 | End: 2025-06-13

## 2025-06-13 RX ADMIN — MEDROXYPROGESTERONE ACETATE 150 MG: 150 INJECTION, SUSPENSION INTRAMUSCULAR at 12:55

## 2025-06-13 NOTE — PROGRESS NOTES
Patient here for Depo. Patient received injection in her right deltoid, tolerated it well. Calendar given for next injection

## 2025-06-25 ENCOUNTER — TELEPHONE (OUTPATIENT)
Dept: OBSTETRICS AND GYNECOLOGY | Facility: HOSPITAL | Age: 35
End: 2025-06-25
Payer: COMMERCIAL

## 2025-06-25 NOTE — TELEPHONE ENCOUNTER
Called patient to go over surgery information but received voicemail. Was not able to leave voicemail due to voicemail not being set up. Sent over surgery info through [employer] message and letter.    Makenzie TESFAYE

## 2025-07-01 ENCOUNTER — ANESTHESIA EVENT (OUTPATIENT)
Dept: OPERATING ROOM | Facility: HOSPITAL | Age: 35
End: 2025-07-01
Payer: COMMERCIAL

## 2025-07-02 ENCOUNTER — HOSPITAL ENCOUNTER (OUTPATIENT)
Facility: HOSPITAL | Age: 35
Setting detail: OUTPATIENT SURGERY
Discharge: HOME | End: 2025-07-02
Attending: OBSTETRICS & GYNECOLOGY | Admitting: OBSTETRICS & GYNECOLOGY
Payer: COMMERCIAL

## 2025-07-02 ENCOUNTER — ANESTHESIA (OUTPATIENT)
Dept: OPERATING ROOM | Facility: HOSPITAL | Age: 35
End: 2025-07-02
Payer: COMMERCIAL

## 2025-07-02 VITALS
BODY MASS INDEX: 24.87 KG/M2 | SYSTOLIC BLOOD PRESSURE: 123 MMHG | HEART RATE: 57 BPM | HEIGHT: 62 IN | RESPIRATION RATE: 17 BRPM | DIASTOLIC BLOOD PRESSURE: 68 MMHG | OXYGEN SATURATION: 95 % | TEMPERATURE: 96.8 F | WEIGHT: 135.14 LBS

## 2025-07-02 DIAGNOSIS — Z87.42 HISTORY OF ABNORMAL CERVICAL PAP SMEAR: Primary | ICD-10-CM

## 2025-07-02 DIAGNOSIS — D27.1 DERMOID CYST OF LEFT OVARY: ICD-10-CM

## 2025-07-02 DIAGNOSIS — Z98.890 POSTOPERATIVE STATE: ICD-10-CM

## 2025-07-02 LAB
ABO GROUP (TYPE) IN BLOOD: NORMAL
ANTIBODY SCREEN: NORMAL
ERYTHROCYTE [DISTWIDTH] IN BLOOD BY AUTOMATED COUNT: 11.8 % (ref 11.5–14.5)
HCT VFR BLD AUTO: 41.1 % (ref 36–46)
HGB BLD-MCNC: 14 G/DL (ref 12–16)
MCH RBC QN AUTO: 31.8 PG (ref 26–34)
MCHC RBC AUTO-ENTMCNC: 34.1 G/DL (ref 32–36)
MCV RBC AUTO: 93 FL (ref 80–100)
NRBC BLD-RTO: 0 /100 WBCS (ref 0–0)
PLATELET # BLD AUTO: 305 X10*3/UL (ref 150–450)
PREGNANCY TEST URINE, POC: NEGATIVE
RBC # BLD AUTO: 4.4 X10*6/UL (ref 4–5.2)
RH FACTOR (ANTIGEN D): NORMAL
WBC # BLD AUTO: 5.4 X10*3/UL (ref 4.4–11.3)

## 2025-07-02 PROCEDURE — 88307 TISSUE EXAM BY PATHOLOGIST: CPT | Mod: TC,SUR | Performed by: OBSTETRICS & GYNECOLOGY

## 2025-07-02 PROCEDURE — 86901 BLOOD TYPING SEROLOGIC RH(D): CPT

## 2025-07-02 PROCEDURE — 3600000004 HC OR TIME - INITIAL BASE CHARGE - PROCEDURE LEVEL FOUR: Performed by: OBSTETRICS & GYNECOLOGY

## 2025-07-02 PROCEDURE — 7100000010 HC PHASE TWO TIME - EACH INCREMENTAL 1 MINUTE: Performed by: OBSTETRICS & GYNECOLOGY

## 2025-07-02 PROCEDURE — 58661 LAPAROSCOPY REMOVE ADNEXA: CPT | Performed by: OBSTETRICS & GYNECOLOGY

## 2025-07-02 PROCEDURE — 3700000001 HC GENERAL ANESTHESIA TIME - INITIAL BASE CHARGE: Performed by: OBSTETRICS & GYNECOLOGY

## 2025-07-02 PROCEDURE — 3700000002 HC GENERAL ANESTHESIA TIME - EACH INCREMENTAL 1 MINUTE: Performed by: OBSTETRICS & GYNECOLOGY

## 2025-07-02 PROCEDURE — 7100000002 HC RECOVERY ROOM TIME - EACH INCREMENTAL 1 MINUTE: Performed by: OBSTETRICS & GYNECOLOGY

## 2025-07-02 PROCEDURE — 36415 COLL VENOUS BLD VENIPUNCTURE: CPT

## 2025-07-02 PROCEDURE — 2500000001 HC RX 250 WO HCPCS SELF ADMINISTERED DRUGS (ALT 637 FOR MEDICARE OP): Mod: SE | Performed by: OBSTETRICS & GYNECOLOGY

## 2025-07-02 PROCEDURE — 81025 URINE PREGNANCY TEST: CPT | Performed by: OBSTETRICS & GYNECOLOGY

## 2025-07-02 PROCEDURE — 2500000004 HC RX 250 GENERAL PHARMACY W/ HCPCS (ALT 636 FOR OP/ED): Mod: JW,SE

## 2025-07-02 PROCEDURE — A58662 PR LAP,FULGURATE/EXCISE LESIONS: Performed by: STUDENT IN AN ORGANIZED HEALTH CARE EDUCATION/TRAINING PROGRAM

## 2025-07-02 PROCEDURE — 7100000001 HC RECOVERY ROOM TIME - INITIAL BASE CHARGE: Performed by: OBSTETRICS & GYNECOLOGY

## 2025-07-02 PROCEDURE — 2720000007 HC OR 272 NO HCPCS: Performed by: OBSTETRICS & GYNECOLOGY

## 2025-07-02 PROCEDURE — 2500000004 HC RX 250 GENERAL PHARMACY W/ HCPCS (ALT 636 FOR OP/ED): Mod: SE | Performed by: OBSTETRICS & GYNECOLOGY

## 2025-07-02 PROCEDURE — 2500000005 HC RX 250 GENERAL PHARMACY W/O HCPCS: Mod: SE | Performed by: OBSTETRICS & GYNECOLOGY

## 2025-07-02 PROCEDURE — 3600000009 HC OR TIME - EACH INCREMENTAL 1 MINUTE - PROCEDURE LEVEL FOUR: Performed by: OBSTETRICS & GYNECOLOGY

## 2025-07-02 PROCEDURE — 3600000008 HC OR TIME - EACH INCREMENTAL 1 MINUTE - PROCEDURE LEVEL THREE: Performed by: OBSTETRICS & GYNECOLOGY

## 2025-07-02 PROCEDURE — 2500000001 HC RX 250 WO HCPCS SELF ADMINISTERED DRUGS (ALT 637 FOR MEDICARE OP): Mod: SE

## 2025-07-02 PROCEDURE — 2500000004 HC RX 250 GENERAL PHARMACY W/ HCPCS (ALT 636 FOR OP/ED): Mod: SE

## 2025-07-02 PROCEDURE — 2500000004 HC RX 250 GENERAL PHARMACY W/ HCPCS (ALT 636 FOR OP/ED): Mod: JZ,SE

## 2025-07-02 PROCEDURE — 85027 COMPLETE CBC AUTOMATED: CPT

## 2025-07-02 PROCEDURE — 3600000003 HC OR TIME - INITIAL BASE CHARGE - PROCEDURE LEVEL THREE: Performed by: OBSTETRICS & GYNECOLOGY

## 2025-07-02 PROCEDURE — 7100000009 HC PHASE TWO TIME - INITIAL BASE CHARGE: Performed by: OBSTETRICS & GYNECOLOGY

## 2025-07-02 RX ORDER — SODIUM CHLORIDE, SODIUM LACTATE, POTASSIUM CHLORIDE, CALCIUM CHLORIDE 600; 310; 30; 20 MG/100ML; MG/100ML; MG/100ML; MG/100ML
INJECTION, SOLUTION INTRAVENOUS CONTINUOUS PRN
Status: DISCONTINUED | OUTPATIENT
Start: 2025-07-02 | End: 2025-07-02

## 2025-07-02 RX ORDER — OXYCODONE HYDROCHLORIDE 5 MG/1
5 TABLET ORAL EVERY 4 HOURS PRN
Status: DISCONTINUED | OUTPATIENT
Start: 2025-07-02 | End: 2025-07-02 | Stop reason: HOSPADM

## 2025-07-02 RX ORDER — ACETAMINOPHEN 325 MG/1
975 TABLET ORAL EVERY 6 HOURS PRN
Qty: 30 TABLET | Refills: 1 | Status: SHIPPED | OUTPATIENT
Start: 2025-07-02

## 2025-07-02 RX ORDER — IBUPROFEN 600 MG/1
600 TABLET, FILM COATED ORAL EVERY 6 HOURS PRN
Qty: 30 TABLET | Refills: 1 | Status: SHIPPED | OUTPATIENT
Start: 2025-07-02

## 2025-07-02 RX ORDER — FENTANYL CITRATE 50 UG/ML
25 INJECTION, SOLUTION INTRAMUSCULAR; INTRAVENOUS EVERY 5 MIN PRN
Status: DISCONTINUED | OUTPATIENT
Start: 2025-07-02 | End: 2025-07-02 | Stop reason: HOSPADM

## 2025-07-02 RX ORDER — LIDOCAINE HYDROCHLORIDE 10 MG/ML
0.1 INJECTION, SOLUTION EPIDURAL; INFILTRATION; INTRACAUDAL; PERINEURAL ONCE
Status: DISCONTINUED | OUTPATIENT
Start: 2025-07-02 | End: 2025-07-02 | Stop reason: HOSPADM

## 2025-07-02 RX ORDER — HYDROMORPHONE HYDROCHLORIDE 1 MG/ML
INJECTION, SOLUTION INTRAMUSCULAR; INTRAVENOUS; SUBCUTANEOUS AS NEEDED
Status: DISCONTINUED | OUTPATIENT
Start: 2025-07-02 | End: 2025-07-02

## 2025-07-02 RX ORDER — ONDANSETRON HYDROCHLORIDE 2 MG/ML
4 INJECTION, SOLUTION INTRAVENOUS ONCE AS NEEDED
Status: COMPLETED | OUTPATIENT
Start: 2025-07-02 | End: 2025-07-02

## 2025-07-02 RX ORDER — MIDAZOLAM HYDROCHLORIDE 1 MG/ML
INJECTION INTRAMUSCULAR; INTRAVENOUS AS NEEDED
Status: DISCONTINUED | OUTPATIENT
Start: 2025-07-02 | End: 2025-07-02

## 2025-07-02 RX ORDER — ALBUTEROL SULFATE 0.83 MG/ML
2.5 SOLUTION RESPIRATORY (INHALATION) ONCE AS NEEDED
Status: DISCONTINUED | OUTPATIENT
Start: 2025-07-02 | End: 2025-07-02 | Stop reason: HOSPADM

## 2025-07-02 RX ORDER — LABETALOL HYDROCHLORIDE 5 MG/ML
5 INJECTION, SOLUTION INTRAVENOUS EVERY 10 MIN PRN
Status: DISCONTINUED | OUTPATIENT
Start: 2025-07-02 | End: 2025-07-02 | Stop reason: HOSPADM

## 2025-07-02 RX ORDER — BUPIVACAINE HYDROCHLORIDE 5 MG/ML
INJECTION, SOLUTION PERINEURAL AS NEEDED
Status: DISCONTINUED | OUTPATIENT
Start: 2025-07-02 | End: 2025-07-02 | Stop reason: HOSPADM

## 2025-07-02 RX ORDER — FENTANYL CITRATE 50 UG/ML
50 INJECTION, SOLUTION INTRAMUSCULAR; INTRAVENOUS EVERY 5 MIN PRN
Status: DISCONTINUED | OUTPATIENT
Start: 2025-07-02 | End: 2025-07-02 | Stop reason: HOSPADM

## 2025-07-02 RX ORDER — PROCHLORPERAZINE EDISYLATE 5 MG/ML
5 INJECTION INTRAMUSCULAR; INTRAVENOUS ONCE AS NEEDED
Status: COMPLETED | OUTPATIENT
Start: 2025-07-02 | End: 2025-07-02

## 2025-07-02 RX ORDER — GABAPENTIN 600 MG/1
600 TABLET ORAL ONCE
Status: COMPLETED | OUTPATIENT
Start: 2025-07-02 | End: 2025-07-02

## 2025-07-02 RX ORDER — ROCURONIUM BROMIDE 10 MG/ML
INJECTION, SOLUTION INTRAVENOUS AS NEEDED
Status: DISCONTINUED | OUTPATIENT
Start: 2025-07-02 | End: 2025-07-02

## 2025-07-02 RX ORDER — HYDRALAZINE HYDROCHLORIDE 20 MG/ML
5 INJECTION INTRAMUSCULAR; INTRAVENOUS ONCE AS NEEDED
Status: DISCONTINUED | OUTPATIENT
Start: 2025-07-02 | End: 2025-07-02 | Stop reason: HOSPADM

## 2025-07-02 RX ORDER — OXYCODONE HYDROCHLORIDE 5 MG/1
10 TABLET ORAL EVERY 4 HOURS PRN
Status: DISCONTINUED | OUTPATIENT
Start: 2025-07-02 | End: 2025-07-02 | Stop reason: HOSPADM

## 2025-07-02 RX ORDER — ONDANSETRON HYDROCHLORIDE 2 MG/ML
INJECTION, SOLUTION INTRAVENOUS AS NEEDED
Status: DISCONTINUED | OUTPATIENT
Start: 2025-07-02 | End: 2025-07-02

## 2025-07-02 RX ORDER — FENTANYL CITRATE 50 UG/ML
INJECTION, SOLUTION INTRAMUSCULAR; INTRAVENOUS AS NEEDED
Status: DISCONTINUED | OUTPATIENT
Start: 2025-07-02 | End: 2025-07-02

## 2025-07-02 RX ORDER — ESMOLOL HYDROCHLORIDE 10 MG/ML
INJECTION INTRAVENOUS AS NEEDED
Status: DISCONTINUED | OUTPATIENT
Start: 2025-07-02 | End: 2025-07-02

## 2025-07-02 RX ORDER — ACETAMINOPHEN 325 MG/1
975 TABLET ORAL ONCE
Status: COMPLETED | OUTPATIENT
Start: 2025-07-02 | End: 2025-07-02

## 2025-07-02 RX ORDER — PROPOFOL 10 MG/ML
INJECTION, EMULSION INTRAVENOUS AS NEEDED
Status: DISCONTINUED | OUTPATIENT
Start: 2025-07-02 | End: 2025-07-02

## 2025-07-02 RX ORDER — CELECOXIB 200 MG/1
400 CAPSULE ORAL ONCE
Status: COMPLETED | OUTPATIENT
Start: 2025-07-02 | End: 2025-07-02

## 2025-07-02 RX ORDER — DEXMEDETOMIDINE HYDROCHLORIDE 100 UG/ML
INJECTION, SOLUTION INTRAVENOUS AS NEEDED
Status: DISCONTINUED | OUTPATIENT
Start: 2025-07-02 | End: 2025-07-02

## 2025-07-02 RX ORDER — LIDOCAINE HYDROCHLORIDE 20 MG/ML
INJECTION, SOLUTION INFILTRATION; PERINEURAL AS NEEDED
Status: DISCONTINUED | OUTPATIENT
Start: 2025-07-02 | End: 2025-07-02

## 2025-07-02 RX ORDER — OXYCODONE HYDROCHLORIDE 5 MG/1
5 TABLET ORAL EVERY 6 HOURS PRN
Qty: 12 TABLET | Refills: 0 | Status: SHIPPED | OUTPATIENT
Start: 2025-07-02

## 2025-07-02 RX ORDER — ACETAMINOPHEN 325 MG/1
650 TABLET ORAL EVERY 4 HOURS PRN
Status: DISCONTINUED | OUTPATIENT
Start: 2025-07-02 | End: 2025-07-02 | Stop reason: HOSPADM

## 2025-07-02 RX ORDER — LABETALOL HYDROCHLORIDE 5 MG/ML
INJECTION, SOLUTION INTRAVENOUS AS NEEDED
Status: DISCONTINUED | OUTPATIENT
Start: 2025-07-02 | End: 2025-07-02

## 2025-07-02 RX ADMIN — LABETALOL HYDROCHLORIDE 5 MG: 5 INJECTION, SOLUTION INTRAVENOUS at 16:27

## 2025-07-02 RX ADMIN — HYDROMORPHONE HYDROCHLORIDE 0.2 MG: 1 INJECTION, SOLUTION INTRAMUSCULAR; INTRAVENOUS; SUBCUTANEOUS at 16:00

## 2025-07-02 RX ADMIN — ESMOLOL HYDROCHLORIDE 20 MG: 10 INJECTION, SOLUTION INTRAVENOUS at 15:45

## 2025-07-02 RX ADMIN — FENTANYL CITRATE 50 MCG: 50 INJECTION INTRAMUSCULAR; INTRAVENOUS at 18:00

## 2025-07-02 RX ADMIN — SODIUM CHLORIDE, POTASSIUM CHLORIDE, SODIUM LACTATE AND CALCIUM CHLORIDE: 600; 310; 30; 20 INJECTION, SOLUTION INTRAVENOUS at 14:50

## 2025-07-02 RX ADMIN — FENTANYL CITRATE 50 MCG: 50 INJECTION INTRAMUSCULAR; INTRAVENOUS at 17:55

## 2025-07-02 RX ADMIN — ACETAMINOPHEN 650 MG: 325 TABLET ORAL at 20:39

## 2025-07-02 RX ADMIN — LIDOCAINE HYDROCHLORIDE 100 MG: 20 INJECTION, SOLUTION INFILTRATION; PERINEURAL at 14:58

## 2025-07-02 RX ADMIN — FENTANYL CITRATE 50 MCG: 50 INJECTION, SOLUTION INTRAMUSCULAR; INTRAVENOUS at 15:03

## 2025-07-02 RX ADMIN — ONDANSETRON 4 MG: 2 INJECTION, SOLUTION INTRAMUSCULAR; INTRAVENOUS at 16:57

## 2025-07-02 RX ADMIN — CELECOXIB 400 MG: 200 CAPSULE ORAL at 13:47

## 2025-07-02 RX ADMIN — ROCURONIUM BROMIDE 20 MG: 10 INJECTION, SOLUTION INTRAVENOUS at 15:35

## 2025-07-02 RX ADMIN — ESMOLOL HYDROCHLORIDE 20 MG: 10 INJECTION, SOLUTION INTRAVENOUS at 16:07

## 2025-07-02 RX ADMIN — FENTANYL CITRATE 50 MCG: 50 INJECTION, SOLUTION INTRAMUSCULAR; INTRAVENOUS at 14:58

## 2025-07-02 RX ADMIN — SUGAMMADEX 250 MG: 100 INJECTION, SOLUTION INTRAVENOUS at 17:35

## 2025-07-02 RX ADMIN — ROCURONIUM BROMIDE 60 MG: 10 INJECTION, SOLUTION INTRAVENOUS at 14:58

## 2025-07-02 RX ADMIN — MIDAZOLAM HYDROCHLORIDE 2 MG: 2 INJECTION, SOLUTION INTRAMUSCULAR; INTRAVENOUS at 14:40

## 2025-07-02 RX ADMIN — DEXAMETHASONE SODIUM PHOSPHATE 4 MG: 4 INJECTION INTRA-ARTICULAR; INTRALESIONAL; INTRAMUSCULAR; INTRAVENOUS; SOFT TISSUE at 15:04

## 2025-07-02 RX ADMIN — ACETAMINOPHEN 975 MG: 325 TABLET ORAL at 13:48

## 2025-07-02 RX ADMIN — PROPOFOL 200 MG: 10 INJECTION, EMULSION INTRAVENOUS at 14:58

## 2025-07-02 RX ADMIN — HYDROMORPHONE HYDROCHLORIDE 0.4 MG: 1 INJECTION, SOLUTION INTRAMUSCULAR; INTRAVENOUS; SUBCUTANEOUS at 15:40

## 2025-07-02 RX ADMIN — DEXMEDETOMIDINE HYDROCHLORIDE 8 MCG: 100 INJECTION, SOLUTION INTRAVENOUS at 17:07

## 2025-07-02 RX ADMIN — ROCURONIUM BROMIDE 20 MG: 10 INJECTION, SOLUTION INTRAVENOUS at 15:25

## 2025-07-02 RX ADMIN — ONDANSETRON 4 MG: 2 INJECTION INTRAMUSCULAR; INTRAVENOUS at 18:49

## 2025-07-02 RX ADMIN — GABAPENTIN 600 MG: 600 TABLET, FILM COATED ORAL at 13:48

## 2025-07-02 RX ADMIN — OXYCODONE 5 MG: 5 TABLET ORAL at 20:39

## 2025-07-02 RX ADMIN — PROCHLORPERAZINE EDISYLATE 5 MG: 5 INJECTION INTRAMUSCULAR; INTRAVENOUS at 19:36

## 2025-07-02 ASSESSMENT — COLUMBIA-SUICIDE SEVERITY RATING SCALE - C-SSRS
2. HAVE YOU ACTUALLY HAD ANY THOUGHTS OF KILLING YOURSELF?: NO
6. HAVE YOU EVER DONE ANYTHING, STARTED TO DO ANYTHING, OR PREPARED TO DO ANYTHING TO END YOUR LIFE?: NO
1. IN THE PAST MONTH, HAVE YOU WISHED YOU WERE DEAD OR WISHED YOU COULD GO TO SLEEP AND NOT WAKE UP?: NO

## 2025-07-02 ASSESSMENT — PAIN - FUNCTIONAL ASSESSMENT
PAIN_FUNCTIONAL_ASSESSMENT: 0-10
PAIN_FUNCTIONAL_ASSESSMENT: 0-10
PAIN_FUNCTIONAL_ASSESSMENT: UNABLE TO SELF-REPORT
PAIN_FUNCTIONAL_ASSESSMENT: 0-10

## 2025-07-02 ASSESSMENT — PAIN SCALES - GENERAL
PAINLEVEL_OUTOF10: 0 - NO PAIN
PAIN_LEVEL: 3
PAINLEVEL_OUTOF10: 6
PAINLEVEL_OUTOF10: 8
PAINLEVEL_OUTOF10: 8

## 2025-07-02 ASSESSMENT — PAIN DESCRIPTION - LOCATION
LOCATION: ABDOMEN

## 2025-07-02 NOTE — ANESTHESIA PREPROCEDURE EVALUATION
Patient: Jarrett Manriquez    Procedure Information       Date/Time: 07/02/25 7196    Procedure: EXCISION, CYST, OVARY, LAPAROSCOPIC (Left)    Location: [facility] OR 03 / Virtual [facility] OR    Surgeons: Sadia Brizuela MD            Relevant Problems   Musculoskeletal   (+) Chronic midline low back pain without sciatica       Clinical information reviewed:    Allergies  Meds     OB Status           NPO Detail:  No data recorded     Physical Exam    Airway  Mallampati: II  TM distance: >3 FB  Neck ROM: full  Mouth opening: 3 or more finger widths     Cardiovascular    Dental - normal exam     Pulmonary    Abdominal            Anesthesia Plan    History of general anesthesia?: no  History of complications of general anesthesia?: unknown/emergency    ASA 1     general     intravenous induction   Postoperative pain plan includes opioids.  Trial extubation is planned.  Anesthetic plan and risks discussed with patient.  Use of blood products discussed with patient who consented to blood products.

## 2025-07-02 NOTE — H&P
History Of Present Illness  Jarrett Manriquez is a 35 y.o. female F presenting for laparoscopic ovarian cyst excision.        Past Medical History  Medical History[1]    Surgical History  Surgical History[2]     Social History  She reports that she has never smoked. She has never used smokeless tobacco. She reports current alcohol use. She reports current drug use. Drug: Marijuana.    Family History  Family History[3]     Allergies  Patient has no known allergies.    Review of Systems  Negative except per HPI     Physical Exam  General: no acute distress  HEENT: normocephalic, atraumatic  CV: warm and well perfused  Lungs: breathing comfortably on room air  Abdomen: NTND  Extremities: moving all extremities spontaneously  Neuro: awake and conversant  Psych: appropriate mood and affect       Last Recorded Vitals  There were no vitals taken for this visit.    Relevant Results  No results found for this or any previous visit (from the past 24 hours).       Imaging   TVUS (3/13/2025)    IMPRESSION:  1. Heterogenous and hypoechoic lesion with punctate calcifications  and posterior acoustic shadowing in the left adnexa measuring up to  3.9 cm likely correlates to known dermoid cyst. No sonographic  evidence of torsion.  2. Small volume free fluid within the pelvic cul-de-sac.      Assessment & Plan  Dermoid cyst of left ovary  Jarrett Manriquez is a 35 y.o. female F presenting for laparoscopic ovarian cyst excision.     - Will proceed with scheduled surgery   - Most recent hgb. 14.1 (1/2025)   - Consents to be signed prior to OR     To be seen & d/w BAILEY Larkin MD  PGY-III, Obstetrics & Gynecology   [facility] [place]          [1]   Past Medical History:  Diagnosis Date    Chlamydial infection, unspecified 06/07/2016    Chlamydia    Contact with and (suspected) exposure to potentially hazardous body fluids 06/06/2016    Exposure to blood or body fluid    Other lesions of  oral mucosa 03/09/2015    Mouth sore    Other specified noninflammatory disorders of vagina 06/10/2016    Vaginal irritation    Personal history of other specified conditions 05/06/2015    History of nausea and vomiting    Presence of (intrauterine) contraceptive device 06/05/2015    Presence of subdermal contraceptive implant    Underweight 03/09/2015    Underweight    Urinary tract infection, site not specified 06/06/2016    UTI (lower urinary tract infection)   [2] History reviewed. No pertinent surgical history.  [3]   Family History  Problem Relation Name Age of Onset    Diabetes Maternal Great-Grandmother      Breast cancer Neg Hx      Ovarian cancer Neg Hx      Colon cancer Neg Hx

## 2025-07-02 NOTE — ANESTHESIA POSTPROCEDURE EVALUATION
Patient: Jarrett Manriquez    Procedure Summary       Date: 07/02/25 Room / Location: [facility] OR 03 / Virtual [facility] OR    Anesthesia Start: 1450 Anesthesia Stop:     Procedure: EXCISION, CYST, OVARY, LAPAROSCOPIC (Left) Diagnosis:       Dermoid cyst of left ovary      (Dermoid cyst of left ovary [D27.1])    Surgeons: Sadia Brizuela MD Responsible Provider: Rayray Gaitan MD    Anesthesia Type: general ASA Status: 1            Anesthesia Type: general    Vitals Value Taken Time   /84 07/02/25 17:43   Temp 36.6 07/02/25 17:43   Pulse 84 07/02/25 17:43   Resp 18 07/02/25 17:43   SpO2 100 07/02/25 17:43       Anesthesia Post Evaluation    Patient location during evaluation: bedside  Patient participation: complete - patient participated  Level of consciousness: responsive to verbal stimuli and sleepy but conscious  Pain score: 3  Pain management: adequate  Multimodal analgesia pain management approach  Airway patency: patent  Cardiovascular status: acceptable  Respiratory status: face mask  Hydration status: acceptable  Postoperative Nausea and Vomiting: none        No notable events documented.

## 2025-07-02 NOTE — PERIOPERATIVE NURSING NOTE
1745 Pt arrived to PACU, alert and able to answer questions and follow commands. Pt shows no signs of distress. Will continue to monitor.     2030 Pt stable and was able to ambulate without difficulty to void. Pt show no signs of distress, and is ready for phase two.     2045 Discharge instructions given to pt. All questions and concerns addressed. Pt stable.     2111 Pt discharged to [facility] via wheelchair with all personal belongings. Pt stable.           Celina Mascorro RN

## 2025-07-02 NOTE — ANESTHESIA PROCEDURE NOTES
Airway  Date/Time: 7/2/2025 2:59 PM  Reason: elective    Airway not difficult    Staffing  Performed: resident   Authorized by: Rayray Gaitan MD    Performed by: Milagro Ramirez MD  Patient location during procedure: OR    Patient Condition  Indications for airway management: anesthesia  Patient position: sniffing  MILS not maintained throughout  Planned trial extubation  Sedation level: deep     Final Airway Details   Preoxygenated: yes  Final airway type: endotracheal airway  Successful airway: ETT  Cuffed: yes   Successful intubation technique: video laryngoscopy (Fair)  Adjuncts used in placement: intubating stylet  Endotracheal tube insertion site: oral  Blade: Parul  Blade size: #3  ETT size (mm): 6.5  Cormack-Lehane Classification: grade I - full view of glottis  Placement verified by: capnometry   Cuff volume (mL): 10  Measured from: lips  ETT to lips (cm): 22  Number of attempts at approach: 1

## 2025-07-02 NOTE — OP NOTE
EXCISION, CYST, OVARY, LAPAROSCOPIC (L) Operative Note     Date: 2025  OR Location: [facility] OR    Name: Jarrett Manriquez, : 1990, Age: 35 y.o., MRN: [id number], Sex: female    Diagnosis  Pre-op Diagnosis      * Dermoid cyst of left ovary [D27.1] Post-op Diagnosis     * Dermoid cyst of left ovary [D27.1]     Procedures  EXCISION, CYST, OVARY, LAPAROSCOPIC  87340 - ME LAPS FULG/EXC OVARY VISCERA/PERITONEAL SURFACE      Surgeons      * Sadia Brizuela - Primary    Resident/Fellow/Other Assistant:  Surgeons and Role:     * Lamar Garrison DO - Resident - Assisting     * Latha Martins MD- Resident- Assisting     Staff:   Circulator: Shannan  Scrub Person: Lobar  Scrub Person: Reva  Circulator: Noy  Relief Scrub: Carmen  Relief Scrub: Salome  Relief Circulator: Salome    Anesthesia Staff: Anesthesiologist: Rayray Gaitan MD; Merry Torres MD  Anesthesia Resident: Milagro Ramirez MD; Hannah Song MD    Procedure Summary  Anesthesia: General  ASA: I  Estimated Blood Loss: 5mL  Intra-op Medications:   Administrations occurring from 1225 to 1445 on 25:   Medication Name Total Dose   acetaminophen (Tylenol) tablet 975 mg 975 mg   celecoxib (CeleBREX) capsule 400 mg 400 mg   gabapentin (Neurontin) tablet 600 mg 600 mg   midazolam PF (Versed) injection 1 mg/mL 2 mg              Anesthesia Record               Intraprocedure I/O Totals          Intake    LR infusion 1000.00 mL    Total Intake 1000 mL          Specimen:   ID Type Source Tests Collected by Time   1 : Left Ovary Tissue OVARY OOPHORECTOMY LEFT SURGICAL PATHOLOGY EXAM Sadia Brizuela MD 2025 1652         Drains and/or Catheters:   [REMOVED] Urethral Catheter Non-latex 16 Fr. (Removed)     Findings: Normal-appearing uterus, dilated left fallopian tube to the level of the fimbriae, normal-appearing right fallopian tube. Enlarged left ovary containing ovarian cyst with hair noted throughout. Normal-appearing right ovary. Liver  with overlying thin, filmy adhesions.    Indications: Jarrett Manriquez is an 35 y.o. female who is having surgery for Dermoid cyst of left ovary [D27.1].     The patient was seen in the preoperative area. The risks, benefits, complications, treatment options, non-operative alternatives, expected recovery and outcomes were discussed with the patient. The possibilities of reaction to medication, pulmonary aspiration, injury to surrounding structures, bleeding, recurrent infection, the need for additional procedures, failure to diagnose a condition, and creating a complication requiring transfusion or operation were discussed with the patient. The patient concurred with the proposed plan, giving informed consent.  The site of surgery was properly noted/marked if necessary per policy. The patient has been actively warmed in preoperative area. Preoperative antibiotics are not indicated. Venous thrombosis prophylaxis have been ordered including bilateral sequential compression devices    The patient was taken to the operating room and general anesthesia was administered. She was then positioned in the dorsal lithotomy position and prepped and draped in the normal sterile fashion. A shore catheter was placed. A speculum was then placed in the vagina. The cervix was visualized and the anterior lip of the cervix was grasped with the single tooth tenaculum. The Hulka uterine manipulator was then introduced and clamped to the cervix and the speculum was removed. Once gentle traction was applied to the Hulka, the cervical clamp was noted to pull through the cervix with a small volume of resultant bleeding that resolved with compression using a sponge stick. The decision was then made to remove the Hulka device completely. Bleeding from the cervix was noted to resolve. A sponge stick was kept in the vagina for uterine manipulation.     Attention was then turned to the abdomen. A 1 cm incision was made at the base of the  umbilicus. The fascia was then entered via open Blood technique. The fascia was tagged with 0-vicryl. A 10 mm trocar port was placed and connected to the CO2 gas. The abdomen was insufflated to an adequate distension. The camera was inserted and confirmed position in the abdominal cavity. Initial survey of the abdomen found it to be devoid of trauma from entry with additional findings as above. Three additional 5 mm trocar ports were placed in the LLQ and RLQ (x2) under direct visualization.     Attention was then turned to the left ovary.  A small incision was made along the ovarian cortex using electrocautery and several attempts were made to establish a plane between the cyst and normal ovarian tissue. There was intermittent leakage of yellow/brown cyst fluid. After multiple attempts, a plane was unable to be formed and the cyst was unable to removed from the ovary. The decision was then made to procedure with a left oophorectomy.      The left infundibulopelvic ligament was then isolated, sealed, and transected using the Ligasure device with good hemostasis noted. The Ligasure was then used to traverse the mesosalpinx until the left ovary was fully resected. Good hemostasis was noted at the IP pedicle and along the mesosalpinx.     The 10mm laparoscope was then exchanged for a 5mm laparoscope and introduced through the right lower quadrant port. A 10mm endocatch bag was introduced through the Angi port site and the left ovary containing the cyst was placed within the bag. The endocatch bag and specimens were removed through the 10mm port site and sent to pathology for permanent section.    The surgical site was again inspected and determined to be hemostatic. The abdomen was cleared of any blood. All ports were then removed from the abdominal cavity. The umbilical fascia at the 10mm port site was closed with an 0-vicryl suture using a figure-of-eight technique. All laparoscopic incisions were closed with a  4-0 monocryl in a subcuticular interrupted fashion.    The uterine manipulator and Pete were then removed. The cervical laceration was again noted to be hemostatic. The patient tolerated the procedure well. All counts were correct x2. Patient was awoken from general anesthesia and taken back to PACU in stable condition.     Complications:  None; patient tolerated the procedure well.    Disposition: PACU - hemodynamically stable.  Condition: stable     Dr. Brizuela was present for the entirety of the procedure,     BAILEY Martins MD  PGY-III, Obstetrics & Gynecology   [facility]

## 2025-07-02 NOTE — DISCHARGE INSTRUCTIONS
Laparoscopic Discharge Instructions  If you have any questions about your care, please contact us at [phone].    Medications and Pain Management  Common areas of pain after laparoscopic surgery include the incision pain, pain in between your shoulder blades, the pelvis and lower back. The gas that was used to distend your abdomen for the surgery is absorbed slowly into your blood stream over the first 3-4 days after surgery. It is not passed intestinally, although, because your abdomen is distended, it may feel similar to intestinal gas. Staying active and walking is the best way to promote the absorption of this gas.  Immediately after surgery, nerve pain is the most intense, typically for the first 6 to 12 hours. As the body heals, it creates inflammation around the incisions sites adding pressure and creating soreness. After 5 days, the inflammation begins to recede and significant improvement in soreness is expected. Pulling on the incisions, especially if sudden, such as when you cough, will reactivate the nerve pain. Support your abdomen with a pillow during coughing or sneezing as this will be helpful to minimize pain. There are two types of pain pills typically used for post-operative pain management, narcotics such as tramadol and an anti-inflammatory such as Ibuprofen or Naprosyn.  Taking regular anti-inflammatory pills, such as 600mg of Ibuprofen every 6 hours for the first 5 days and then as needed is recommended. You can alternate ibuprofen with tylenol (975mg or 1000mg). The tylenol can be taken every 6 hours.  If you have problems using NSAIDs, be sure to discuss this with your doctor. The narcotic can be used on a schedule for the first 1 to 2 days but after that, only as you need it. Narcotics can cause constipation, nausea, sleepiness and headaches. You may begin your usual home medications as you were taking before unless directed by your doctor.    Incisional care  Paper tape  steri-strips are typically used for the abdominal incisions. The steri-strips will fall off on their own or can be removed after one week, this is easiest to do in the shower. You may shower and use a mild soap around the incisions and pat dry. Do not use a washcloth or scrub the incisions. Using peroxide or antiseptics is not recommended for routine care. Avoid hot and steamy showers as this may cause you to feel faint. No tub baths for six weeks following surgery. There may be discoloration or bruising around the incisions. This is normal and may take several weeks to resolve. Firmness or a nodular area under the skin near the incision may represent a collection of blood, this too will resolve on its own after a little time. If any incision develops tenderness, redness in the skin layer or has drainage please call the office.    GI Function, Nausea and Constipation  Nausea can occasionally be an issue in the first few days after surgery. It is usually caused as a side effect from the anesthesia and pain medicine, particularly narcotics. Taking the pain pills with food is a food way to proactively minimize this. Throwing up, especially after the first day, is not expected and if this happens, you should call your doctor. Feeling gassy and constipation can be a problem for the first week after surgery. Limiting the use of narcotics may be helpful. Stool softeners twice a day and a high fiber diet are safe. If needed, Miralax once daily is a good choice. If no bowel movement after 3 days, you will need to increase the Miralax until soft, regular bowel movements are passing.    Urinating  Because the bladder is disturbed by the surgery, the normal sensation may be temporarily altered. You may not be aware that your bladder is full. If the bladder is allowed to get over distended, it may make the problem worse. This is why we make sure that you are able to empty your bladder adequately before you go home. For the first  few days at home, you should make a point to empty your bladder every 3 to 4 hours. Pain with voiding, especially after the first day, is not expected and may represent a bladder infection.    Activity  For the first two days post-operatively, your soreness and recovery from anesthesia will limit your activity  Stairs are safe, just take your time  At a minimum during this time, you should walk around for 10-15 minutes every 2-3 hours. After that, in the first week, any activity except for overt exercise is safe.   During the first week you should not commit to being on your feet for more than 30 minutes at a time.   During the second week, light exercise is encouraged.  After 2 weeks from surgery, you should try to get back into regular activity.   For healing, please limit the amount of weight lifted to 8-10 pounds (a gallon of milk) for the first 2 weeks after surgery.   Driving is usually okay after the first few days. You must be able to comfortably wear a seatbelt, press the gas/brake pedals, and drive defensively. You may not drive while taking narcotic pain medicines.    When to Call the Doctor  Call for any fever above 100.4 F (If you do not feel feverish you do not have to routinely check your temperature.)  Call for severe pain not improved by medications  Call for persistent nausea, vomiting  Call for vaginal bleeding that is heavy as a period or passing blood clots larger than a quarter  Call for unusual swelling in your legs  Call if the incisions develop painful redness and discharge    In an emergency, call 911 or go to an Emergency Department at a nearby hospital

## 2025-07-03 ENCOUNTER — APPOINTMENT (OUTPATIENT)
Dept: DERMATOLOGY | Facility: CLINIC | Age: 35
End: 2025-07-03
Payer: COMMERCIAL

## 2025-07-10 LAB
LABORATORY COMMENT REPORT: NORMAL
PATH REPORT.FINAL DX SPEC: NORMAL
PATH REPORT.GROSS SPEC: NORMAL
PATH REPORT.RELEVANT HX SPEC: NORMAL
PATH REPORT.TOTAL CANCER: NORMAL

## 2025-07-15 ENCOUNTER — TELEPHONE (OUTPATIENT)
Dept: OBSTETRICS AND GYNECOLOGY | Facility: HOSPITAL | Age: 35
End: 2025-07-15
Payer: COMMERCIAL

## 2025-07-16 NOTE — TELEPHONE ENCOUNTER
Called patient to go over return to work letter but received voicemal. Could not leave message due to voicemail box not being set up. Will message patient.      Makenzie TESFAYE

## 2025-07-24 ENCOUNTER — OFFICE VISIT (OUTPATIENT)
Dept: OBSTETRICS AND GYNECOLOGY | Facility: HOSPITAL | Age: 35
End: 2025-07-24
Payer: COMMERCIAL

## 2025-07-24 VITALS — WEIGHT: 132 LBS | BODY MASS INDEX: 24.14 KG/M2 | SYSTOLIC BLOOD PRESSURE: 122 MMHG | DIASTOLIC BLOOD PRESSURE: 83 MMHG

## 2025-07-24 DIAGNOSIS — Z09 POSTOPERATIVE EXAMINATION: Primary | ICD-10-CM

## 2025-07-24 PROCEDURE — 99211 OFF/OP EST MAY X REQ PHY/QHP: CPT | Performed by: OBSTETRICS & GYNECOLOGY

## 2025-07-24 PROCEDURE — 1036F TOBACCO NON-USER: CPT | Performed by: OBSTETRICS & GYNECOLOGY

## 2025-07-24 RX ORDER — METRONIDAZOLE 500 MG/1
1 TABLET ORAL
COMMUNITY
Start: 2025-07-20

## 2025-07-24 RX ORDER — NITROFURANTOIN 25; 75 MG/1; MG/1
1 CAPSULE ORAL
COMMUNITY
Start: 2025-07-20

## 2025-07-24 ASSESSMENT — PAIN SCALES - GENERAL: PAINLEVEL_OUTOF10: 0-NO PAIN

## 2025-07-24 NOTE — PROGRESS NOTES
SUBJECTIVE    35 y.o.  Injection presents for postoperative visit. She had a laparoscopic left oophorectomy on .      She is overall doing well. Pain is controlled. She is eating and drinking normally. Initially had some constipation but is not regular.     OBJECTIVE  Vitals:    25 1003   BP: 122/83   Weight: 59.9 kg (132 lb)     Body mass index is 24.14 kg/m².     Physical Exam  Constitutional:       Appearance: Normal appearance.   HENT:      Head: Normocephalic and atraumatic.      Nose: Nose normal.      Mouth/Throat:      Mouth: Mucous membranes are moist.     Eyes:      Extraocular Movements: Extraocular movements intact.      Pupils: Pupils are equal, round, and reactive to light.       Cardiovascular:      Rate and Rhythm: Normal rate.   Pulmonary:      Effort: Pulmonary effort is normal.   Abdominal:      General: Abdomen is flat. There is no distension.      Palpations: Abdomen is soft.      Tenderness: There is no abdominal tenderness.      Comments: Umbilical incision with small; amount external suture, trimmed and removed     Musculoskeletal:         General: Normal range of motion.      Cervical back: Normal range of motion.     Neurological:      General: No focal deficit present.      Mental Status: She is alert and oriented to person, place, and time.     Skin:     General: Skin is warm and dry.     Psychiatric:         Mood and Affect: Mood normal.         Behavior: Behavior normal.   Vitals and nursing note reviewed.          ASSESSMENT & PLAN  Problem List Items Addressed This Visit    None      Postoperative Status  - Pain is well controlled  - Normal bowel and bladder function  - Incision - suture trimmed from umbilical incision encouraged to follow up with signs on infection  - Pathology reviewed consistent with benign dermoid cyst; reviewed intraoperatively unable to remove cyst from ovary; reviewed otherwise normal right ovary, fallopian tubes    Follow up: As needed or for  annual exam    Sadia Brizuela MD  Obstetrics & Gynecology  07/24/25

## 2025-08-29 ENCOUNTER — CLINICAL SUPPORT (OUTPATIENT)
Facility: HOSPITAL | Age: 35
End: 2025-08-29
Payer: COMMERCIAL

## 2025-08-29 VITALS — HEART RATE: 84 BPM | DIASTOLIC BLOOD PRESSURE: 80 MMHG | SYSTOLIC BLOOD PRESSURE: 114 MMHG

## 2025-08-29 DIAGNOSIS — Z30.42 ENCOUNTER FOR DEPO-PROVERA CONTRACEPTION: Primary | ICD-10-CM

## 2025-08-29 PROCEDURE — 99211 OFF/OP EST MAY X REQ PHY/QHP: CPT

## 2025-08-29 PROCEDURE — 96372 THER/PROPH/DIAG INJ SC/IM: CPT | Performed by: FAMILY MEDICINE

## 2025-08-29 PROCEDURE — 2500000004 HC RX 250 GENERAL PHARMACY W/ HCPCS (ALT 636 FOR OP/ED): Mod: JZ | Performed by: FAMILY MEDICINE

## 2025-08-29 RX ORDER — MEDROXYPROGESTERONE ACETATE 150 MG/ML
150 INJECTION, SUSPENSION INTRAMUSCULAR ONCE
Status: COMPLETED | OUTPATIENT
Start: 2025-08-29 | End: 2025-08-29

## 2025-08-29 RX ADMIN — MEDROXYPROGESTERONE ACETATE 150 MG: 150 INJECTION, SUSPENSION INTRAMUSCULAR at 15:52

## (undated) DEVICE — HOLSTER, JET SAFETY

## (undated) DEVICE — TOWELS 4-PK

## (undated) DEVICE — SUTURE, VICRYL, 0, 27 IN, UR-6, VIOLET

## (undated) DEVICE — COVER, CART, 45 X 27 X 48 IN, CLEAR

## (undated) DEVICE — TUBE SET, PNEUMOCLEAR, SMOKE EVACU, HIGH-FLOW

## (undated) DEVICE — SUTURE, VICRYL, 4-0, 18 IN, UNDYED BR PS-2

## (undated) DEVICE — GOWN, SURGICAL, SMARTGOWN, XLARGE, STERILE

## (undated) DEVICE — PREP TRAY, SKIN, DRY, W/GLOVES

## (undated) DEVICE — RETRIEVAL SYSTEM, MONARCH, 10MM DISP ENDOSCOPIC

## (undated) DEVICE — ADHESIVE, SKIN, DERMABOND ADVANCED, 15CM, PEN-STYLE

## (undated) DEVICE — PROTECTOR, NERVE, ULNAR, PINK

## (undated) DEVICE — REST, HEAD, BAGEL, 9 IN

## (undated) DEVICE — TUBE, SALEM SUMP, 16 FR X 48IN, ENFIT

## (undated) DEVICE — SYSTEM, FIOS FIRST ENTRY, 5 X 100MM, KII ADVANCED FIXATION

## (undated) DEVICE — MANIFOLD, 4 PORT NEPTUNE STANDARD

## (undated) DEVICE — LIGASURE, SEALER/DIVIDER MARYLAND JAW, 5MM

## (undated) DEVICE — Device

## (undated) DEVICE — APPLICATOR, CHLORAPREP, W/ORANGE TINT, 26ML

## (undated) DEVICE — SUTURE, VICRYL PLUS, 0, 27IN, UR-6, VIOLET, BRAIDED

## (undated) DEVICE — PUMP, STRYKERFLOW 2 & HANDPIECE W/10FT. IRRIGATION TUBING

## (undated) DEVICE — TROCAR SYSTEM, BALLOON, KII GELPORT, 12 X 100MM

## (undated) DEVICE — CANNULA, KII ADVANCED FIXATION, 5X100MM W/SEAL

## (undated) DEVICE — CARE KIT, LAPAROSCOPIC, ADVANCED